# Patient Record
Sex: FEMALE | Race: OTHER | Employment: FULL TIME
[De-identification: names, ages, dates, MRNs, and addresses within clinical notes are randomized per-mention and may not be internally consistent; named-entity substitution may affect disease eponyms.]

---

## 2022-06-14 ENCOUNTER — OFFICE VISIT (OUTPATIENT)
Dept: FAMILY MEDICINE CLINIC | Facility: CLINIC | Age: 22
End: 2022-06-14
Payer: COMMERCIAL

## 2022-06-14 VITALS
BODY MASS INDEX: 39.25 KG/M2 | DIASTOLIC BLOOD PRESSURE: 80 MMHG | OXYGEN SATURATION: 99 % | SYSTOLIC BLOOD PRESSURE: 132 MMHG | HEIGHT: 69 IN | WEIGHT: 265 LBS | HEART RATE: 79 BPM

## 2022-06-14 DIAGNOSIS — E66.9 CLASS 2 OBESITY WITH BODY MASS INDEX (BMI) OF 39.0 TO 39.9 IN ADULT, UNSPECIFIED OBESITY TYPE, UNSPECIFIED WHETHER SERIOUS COMORBIDITY PRESENT: ICD-10-CM

## 2022-06-14 DIAGNOSIS — G44.229 CHRONIC TENSION-TYPE HEADACHE, NOT INTRACTABLE: Primary | ICD-10-CM

## 2022-06-14 DIAGNOSIS — G44.229 CHRONIC TENSION-TYPE HEADACHE, NOT INTRACTABLE: ICD-10-CM

## 2022-06-14 DIAGNOSIS — F43.9 SITUATIONAL STRESS: ICD-10-CM

## 2022-06-14 DIAGNOSIS — Z23 ENCOUNTER FOR IMMUNIZATION: ICD-10-CM

## 2022-06-14 DIAGNOSIS — Z12.4 SCREENING FOR CERVICAL CANCER: ICD-10-CM

## 2022-06-14 DIAGNOSIS — Z00.00 LABORATORY EXAM ORDERED AS PART OF ROUTINE GENERAL MEDICAL EXAMINATION: ICD-10-CM

## 2022-06-14 LAB
ALBUMIN SERPL-MCNC: 3.9 G/DL (ref 3.5–5)
ALBUMIN/GLOB SERPL: 1.1 {RATIO} (ref 1.2–3.5)
ALP SERPL-CCNC: 80 U/L (ref 50–136)
ALT SERPL-CCNC: 20 U/L (ref 12–65)
ANION GAP SERPL CALC-SCNC: 9 MMOL/L (ref 7–16)
AST SERPL-CCNC: 12 U/L (ref 15–37)
BASOPHILS # BLD: 0 K/UL (ref 0–0.2)
BASOPHILS NFR BLD: 0 % (ref 0–2)
BILIRUB SERPL-MCNC: 0.3 MG/DL (ref 0.2–1.1)
BUN SERPL-MCNC: 15 MG/DL (ref 6–23)
CALCIUM SERPL-MCNC: 9.4 MG/DL (ref 8.3–10.4)
CHLORIDE SERPL-SCNC: 107 MMOL/L (ref 98–107)
CHOLEST SERPL-MCNC: 189 MG/DL
CO2 SERPL-SCNC: 23 MMOL/L (ref 21–32)
CREAT SERPL-MCNC: 0.9 MG/DL (ref 0.6–1)
DIFFERENTIAL METHOD BLD: NORMAL
EOSINOPHIL # BLD: 0.1 K/UL (ref 0–0.8)
EOSINOPHIL NFR BLD: 1 % (ref 0.5–7.8)
ERYTHROCYTE [DISTWIDTH] IN BLOOD BY AUTOMATED COUNT: 13.4 % (ref 11.9–14.6)
GLOBULIN SER CALC-MCNC: 3.6 G/DL (ref 2.3–3.5)
GLUCOSE SERPL-MCNC: 90 MG/DL (ref 65–100)
HCT VFR BLD AUTO: 37.4 % (ref 35.8–46.3)
HCV AB SER QL: NONREACTIVE
HDLC SERPL-MCNC: 54 MG/DL (ref 40–60)
HDLC SERPL: 3.5 {RATIO}
HGB BLD-MCNC: 11.8 G/DL (ref 11.7–15.4)
IMM GRANULOCYTES # BLD AUTO: 0.1 K/UL (ref 0–0.5)
IMM GRANULOCYTES NFR BLD AUTO: 1 % (ref 0–5)
LDLC SERPL CALC-MCNC: 121.4 MG/DL
LYMPHOCYTES # BLD: 2.5 K/UL (ref 0.5–4.6)
LYMPHOCYTES NFR BLD: 24 % (ref 13–44)
MAGNESIUM SERPL-MCNC: 2.3 MG/DL (ref 1.8–2.4)
MCH RBC QN AUTO: 27.8 PG (ref 26.1–32.9)
MCHC RBC AUTO-ENTMCNC: 31.6 G/DL (ref 31.4–35)
MCV RBC AUTO: 88.2 FL (ref 79.6–97.8)
MONOCYTES # BLD: 0.7 K/UL (ref 0.1–1.3)
MONOCYTES NFR BLD: 6 % (ref 4–12)
NEUTS SEG # BLD: 7.1 K/UL (ref 1.7–8.2)
NEUTS SEG NFR BLD: 68 % (ref 43–78)
NRBC # BLD: 0 K/UL (ref 0–0.2)
PLATELET # BLD AUTO: 412 K/UL (ref 150–450)
PMV BLD AUTO: 9.5 FL (ref 9.4–12.3)
POTASSIUM SERPL-SCNC: 4 MMOL/L (ref 3.5–5.1)
PROT SERPL-MCNC: 7.5 G/DL (ref 6.3–8.2)
RBC # BLD AUTO: 4.24 M/UL (ref 4.05–5.2)
SODIUM SERPL-SCNC: 139 MMOL/L (ref 136–145)
T4 FREE SERPL-MCNC: 1.3 NG/DL (ref 0.9–1.8)
TRIGL SERPL-MCNC: 68 MG/DL (ref 35–150)
TSH, 3RD GENERATION: 1.35 UIU/ML (ref 0.36–3.74)
VLDLC SERPL CALC-MCNC: 13.6 MG/DL (ref 6–23)
WBC # BLD AUTO: 10.4 K/UL (ref 4.3–11.1)

## 2022-06-14 PROCEDURE — 99203 OFFICE O/P NEW LOW 30 MIN: CPT | Performed by: NURSE PRACTITIONER

## 2022-06-14 RX ORDER — CYCLOBENZAPRINE HCL 5 MG
5 TABLET ORAL 2 TIMES DAILY PRN
Qty: 30 TABLET | Refills: 2 | Status: SHIPPED | OUTPATIENT
Start: 2022-06-14 | End: 2022-08-15

## 2022-06-14 SDOH — ECONOMIC STABILITY: FOOD INSECURITY: WITHIN THE PAST 12 MONTHS, THE FOOD YOU BOUGHT JUST DIDN'T LAST AND YOU DIDN'T HAVE MONEY TO GET MORE.: NEVER TRUE

## 2022-06-14 SDOH — ECONOMIC STABILITY: FOOD INSECURITY: WITHIN THE PAST 12 MONTHS, YOU WORRIED THAT YOUR FOOD WOULD RUN OUT BEFORE YOU GOT MONEY TO BUY MORE.: NEVER TRUE

## 2022-06-14 ASSESSMENT — ENCOUNTER SYMPTOMS
FACIAL SWELLING: 0
GASTROINTESTINAL NEGATIVE: 1
ABDOMINAL DISTENTION: 0
ALLERGIC/IMMUNOLOGIC NEGATIVE: 1
EYE PAIN: 0
DIARRHEA: 0
STRIDOR: 0
NAUSEA: 0
RESPIRATORY NEGATIVE: 1
TROUBLE SWALLOWING: 0
SINUS PAIN: 0
SORE THROAT: 0
SINUS PRESSURE: 0
RECTAL PAIN: 0
BLOOD IN STOOL: 0
WHEEZING: 0
BACK PAIN: 0
VOMITING: 0
PHOTOPHOBIA: 1
ABDOMINAL PAIN: 0
CONSTIPATION: 0
EYE DISCHARGE: 0
SHORTNESS OF BREATH: 0
COUGH: 0
RHINORRHEA: 0
CHEST TIGHTNESS: 0
VOICE CHANGE: 0
ANAL BLEEDING: 0

## 2022-06-14 ASSESSMENT — PATIENT HEALTH QUESTIONNAIRE - PHQ9
SUM OF ALL RESPONSES TO PHQ QUESTIONS 1-9: 13
6. FEELING BAD ABOUT YOURSELF - OR THAT YOU ARE A FAILURE OR HAVE LET YOURSELF OR YOUR FAMILY DOWN: 1
5. POOR APPETITE OR OVEREATING: 2
2. FEELING DOWN, DEPRESSED OR HOPELESS: 1
4. FEELING TIRED OR HAVING LITTLE ENERGY: 2
SUM OF ALL RESPONSES TO PHQ QUESTIONS 1-9: 13
3. TROUBLE FALLING OR STAYING ASLEEP: 2
9. THOUGHTS THAT YOU WOULD BE BETTER OFF DEAD, OR OF HURTING YOURSELF: 0
10. IF YOU CHECKED OFF ANY PROBLEMS, HOW DIFFICULT HAVE THESE PROBLEMS MADE IT FOR YOU TO DO YOUR WORK, TAKE CARE OF THINGS AT HOME, OR GET ALONG WITH OTHER PEOPLE: 1
SUM OF ALL RESPONSES TO PHQ QUESTIONS 1-9: 13
SUM OF ALL RESPONSES TO PHQ9 QUESTIONS 1 & 2: 3
8. MOVING OR SPEAKING SO SLOWLY THAT OTHER PEOPLE COULD HAVE NOTICED. OR THE OPPOSITE, BEING SO FIGETY OR RESTLESS THAT YOU HAVE BEEN MOVING AROUND A LOT MORE THAN USUAL: 1
7. TROUBLE CONCENTRATING ON THINGS, SUCH AS READING THE NEWSPAPER OR WATCHING TELEVISION: 2
SUM OF ALL RESPONSES TO PHQ QUESTIONS 1-9: 13
1. LITTLE INTEREST OR PLEASURE IN DOING THINGS: 2

## 2022-06-14 ASSESSMENT — ANXIETY QUESTIONNAIRES
6. BECOMING EASILY ANNOYED OR IRRITABLE: 3
7. FEELING AFRAID AS IF SOMETHING AWFUL MIGHT HAPPEN: 1
IF YOU CHECKED OFF ANY PROBLEMS ON THIS QUESTIONNAIRE, HOW DIFFICULT HAVE THESE PROBLEMS MADE IT FOR YOU TO DO YOUR WORK, TAKE CARE OF THINGS AT HOME, OR GET ALONG WITH OTHER PEOPLE: SOMEWHAT DIFFICULT
1. FEELING NERVOUS, ANXIOUS, OR ON EDGE: 2
2. NOT BEING ABLE TO STOP OR CONTROL WORRYING: 1
5. BEING SO RESTLESS THAT IT IS HARD TO SIT STILL: 1
4. TROUBLE RELAXING: 2
3. WORRYING TOO MUCH ABOUT DIFFERENT THINGS: 1
GAD7 TOTAL SCORE: 11

## 2022-06-14 ASSESSMENT — SOCIAL DETERMINANTS OF HEALTH (SDOH): HOW HARD IS IT FOR YOU TO PAY FOR THE VERY BASICS LIKE FOOD, HOUSING, MEDICAL CARE, AND HEATING?: NOT HARD AT ALL

## 2022-06-14 NOTE — PROGRESS NOTES
[address]  Phone: [phone] Fax [phone]  Jadyn Andre. Jeannine MS, APRN, FNP-C  6/14/2022   Chief Complaint   Patient presents with    New Patient     Pt here today to [facility] care. Pt denies having a recent PCP. Pt denies taking any medications and denies having any sig PMH. The pt has two main concerns today    Headache     Pt reports having frequent tension migraines over past several months-up to 3 times a week recently. Pt reports that headaches start as trapezius and cervical paraspinus muscle tension that spreads to back, top, then front of her head. Has some photophobia associated. Denies any associated blurred vision, focal weakness or neuro defs, dizziness, syncope, CP, SOB, N/V/D/abd pain, or fever. Takes OTC Ibuprofen with partial relief    Stress     Pt reports having some situational depression/anxiety recently related to being frustrated with unsuccessful weight loss attempts. Pt denies any SI, HI, hallucinations. Pt interested in referral for counseling        ASSESSMENT/PLAN:  Below is the assessment and plan developed based on review of pertinent history, physical exam, labs, studies, and medications. 1. Chronic tension-type headache, not intractable  Pt reports having frequent tension migraines over past several months-up to 3 times a week recently. Pt reports that headaches start as trapezius and cervical paraspinus muscle tension that spreads to back, top, then front of her head. Has some photophobia associated. Denies any associated blurred vision, focal weakness or neuro defs, dizziness, syncope, CP, SOB, N/V/D/abd pain, or fever. Takes OTC Ibuprofen with partial relief. Pt also reports that she had a recent eye exam and got new glasses, but this has not helped with the tension migraines. Pt had no focal weakness or neuro defs on physical exam today. BP /80. Discussed with pt.  Seems to be having probable tension migraines. Will check CBC, CMP, TFT's, magnesium, and sed rate today to further evaluate. Will have pt try OTC Excedrin Migraine as directed and will give PRN low dose Flexeril as directed as well to try to help relieve her tension migraine. Urgent referral given to Neurology as well for further evaluation and treatment. Pt also encouraged to consider having a massage to help relieve her trapezius/cervical paraspinus muscle tension. Pt to f/u with me in 3 weeks to recheck. Agrees to call sooner for concerns/new or worsening symptoms. Agrees to go to ER for severe symptoms as discussed. - CBC with Auto Differential; Future  - Comprehensive Metabolic Panel; Future  - TSH; Future  - T4, Free; Future  - Magnesium; Future  - Sedimentation Rate; Future  - [address] Neurology [facility]  - cyclobenzaprine (FLEXERIL) 5 MG tablet; Take 1 tablet by mouth 2 times daily as needed (tension headaches-watch for sedation)  Dispense: 30 tablet; Refill: 2    2. Situational stress  Pt reports having some situational depression/anxiety recently related to being frustrated with unsuccessful weight loss attempts. Pt denies any SI, HI, hallucinations. Pt interested in referral for counseling. Discussed with pt. Will refer pt to Behavioral Health to start counseling. Pt encouraged to not be hard on herself regarding her weight loss. Pt encouraged to eat a healthy balanced diet and exercise as much as she can given her chronic tension migraines. Hopefully as her tension migraines improve, she will be able to exercise more and the weight loss will follow. Pt to f/u with me in 3 weeks to recheck. Pt agrees to call sooner for concerns/new or worsening symptoms.   - TSH; Future  - T4, Free; Future  - [id number] [facility]    3. Laboratory exam ordered as part of routine general medical examination  Pt to have following baseline labs drawn today.  Will discuss results with pt at next f/u.   - CBC with Auto Differential; Future  - Comprehensive Metabolic Panel; Future  - Lipid Panel; Future  - TSH; Future  - T4, Free; Future  - Hepatitis C Antibody; Future    4. Encounter for immunization  Pt reports being UTD on Tdap in . Pt also reports being UTD on Covid vax. Agrees to bring card to next appointment so I can update HCM. 5. Screening for cervical cancer  Pt due for PAP/[facility]. Referral given to [facility] - Ewa Torres DO, OB/GYN, [place]    6. Class 2 obesity with body mass index (BMI) of 39.0 to 39.9 in adult, unspecified obesity type, unspecified whether serious comorbidity present  Pt encouraged to continue to eat a healthy balanced diet and exercise as much as she can given her chronic tension migraines. Return in about 3 weeks (around 2022) for To recheck headaches/situational stress/review labs. Call sooner for concerns. SUBJECTIVE/OBJECTIVE:    CHEL-  Leonel Zelaya (: 2000) is a 24 y.o. female, New patient patient, here for evaluation of the following chief complaint(s):  New Patient (Pt here today to [facility] care. Pt denies having a recent PCP. Pt denies taking any medications and denies having any sig PMH. The pt has two main concerns today), Headache (Pt reports having frequent tension migraines over past several months-up to 3 times a week recently. Pt reports that headaches start as trapezius and cervical paraspinus muscle tension that spreads to back, top, then front of her head. Has some photophobia associated. Denies any associated blurred vision, focal weakness or neuro defs, dizziness, syncope, CP, SOB, N/V/D/abd pain, or fever. Takes OTC Ibuprofen with partial relief), and Stress (Pt reports having some situational depression/anxiety recently related to being frustrated with unsuccessful weight loss attempts. Pt denies any SI, HI, hallucinations. Pt interested in referral for counseling)  LMP-current.    Allergies   Allergen Reactions    Shellfish-Derived Products      [unfilled]  History reviewed. No pertinent past medical history. History reviewed. No pertinent surgical history. Family History   Problem Relation Age of Onset    No Known Problems Mother     No Known Problems Father     No Known Problems Brother     No Known Problems Brother     No Known Problems Brother     No Known Problems Brother     Diabetes Maternal Grandmother     Lung Cancer Maternal Grandfather     Diabetes Paternal Grandmother     No Known Problems Paternal Grandfather      Social History     Tobacco Use   Smoking Status Never Smoker   Smokeless Tobacco Never Used         Review of Systems   Constitutional: Negative. Negative for appetite change, chills, diaphoresis, fatigue, fever and unexpected weight change. HENT: Negative. Negative for congestion, ear discharge, ear pain, facial swelling, hearing loss, mouth sores, nosebleeds, postnasal drip, rhinorrhea, sinus pressure, sinus pain, sneezing, sore throat, tinnitus, trouble swallowing and voice change. Eyes: Positive for photophobia (with tension migraines). Negative for pain, discharge and visual disturbance. Respiratory: Negative. Negative for cough, chest tightness, shortness of breath, wheezing and stridor. Cardiovascular: Negative. Negative for chest pain, palpitations and leg swelling. Gastrointestinal: Negative. Negative for abdominal distention, abdominal pain, anal bleeding, blood in stool, constipation, diarrhea, nausea, rectal pain and vomiting. Endocrine: Negative. Genitourinary: Negative. Negative for decreased urine volume, difficulty urinating, dyspareunia, dysuria, flank pain, frequency, genital sores, hematuria, menstrual problem, pelvic pain, urgency, vaginal bleeding, vaginal discharge and vaginal pain.    Musculoskeletal: Positive for neck pain (Trapezius and cervical paraspinus muscle tension that spreads to back, top, front of head during tension migraines). Negative for arthralgias, back pain, gait problem, joint swelling, myalgias and neck stiffness. Skin: Negative. Negative for pallor and rash. Allergic/Immunologic: Negative. Negative for environmental allergies. Neurological: Positive for headaches (Pt reports having frequent tension migraines over past several months-up to 3 times a week recently). Negative for dizziness, tremors, seizures, syncope, facial asymmetry, speech difficulty, weakness, light-headedness and numbness. Hematological: Negative. Negative for adenopathy. Does not bruise/bleed easily. Psychiatric/Behavioral: Positive for dysphoric mood. Negative for hallucinations, self-injury, sleep disturbance and suicidal ideas. The patient is nervous/anxious. Pt reports having some situational depression/anxiety recently related to being frustrated with unsuccessful weight loss attempts. Pt denies any SI, HI, hallucinations. Vitals:    06/14/22 1543   BP: 132/80   Pulse: 79   SpO2: 99%       Physical Exam  Vitals reviewed. Constitutional:       General: She is not in acute distress. Appearance: Normal appearance. She is obese. She is not ill-appearing, toxic-appearing or diaphoretic. HENT:      Head: Normocephalic and atraumatic. Right Ear: Tympanic membrane, ear canal and external ear normal. There is no impacted cerumen. Left Ear: Tympanic membrane, ear canal and external ear normal. There is no impacted cerumen. Nose: Nose normal. No congestion or rhinorrhea. Mouth/Throat:      Mouth: Mucous membranes are moist.      Pharynx: Oropharynx is clear. No oropharyngeal exudate or posterior oropharyngeal erythema. Eyes:      General: No scleral icterus. Right eye: No discharge. Left eye: No discharge. Extraocular Movements: Extraocular movements intact. Conjunctiva/sclera: Conjunctivae normal.      Pupils: Pupils are equal, round, and reactive to light.    Neck: Comments: Good C and L spine alignment. No edema or point ttp to cervical vertebrae. Pt has some bilat trapezius and bilat cervical paraspinus spasm/tenderness noted   Cardiovascular:      Rate and Rhythm: Normal rate and regular rhythm. Pulses: Normal pulses. Heart sounds: Normal heart sounds. No murmur heard. No friction rub. No gallop. Pulmonary:      Effort: Pulmonary effort is normal. No respiratory distress. Breath sounds: Normal breath sounds. No stridor. No wheezing, rhonchi or rales. Chest:      Chest wall: No tenderness. Abdominal:      General: Abdomen is flat. Bowel sounds are normal. There is no distension. Palpations: Abdomen is soft. There is no mass. Tenderness: There is no abdominal tenderness. There is no right CVA tenderness, left CVA tenderness, guarding or rebound. Hernia: No hernia is present. Musculoskeletal:         General: Normal range of motion. Cervical back: Normal range of motion and neck supple. Tenderness present. No rigidity. Comments: Gait steady and unassisted   Lymphadenopathy:      Cervical: No cervical adenopathy. Skin:     General: Skin is warm. Coloration: Skin is not jaundiced or pale. Findings: No bruising or rash. Neurological:      General: No focal deficit present. Mental Status: She is alert and oriented to person, place, and time. Cranial Nerves: No cranial nerve deficit. Sensory: No sensory deficit. Motor: No weakness. Coordination: Coordination normal.      Gait: Gait normal.   Psychiatric:         Mood and Affect: Mood normal.         Behavior: Behavior normal.         Thought Content:  Thought content normal.         Judgment: Judgment normal.       PHQ-9 Total Score: 13 (6/14/2022  3:45 PM)  Thoughts that you would be better off dead, or of hurting yourself in some way: 0 (6/14/2022  3:45 PM)    NIKOS-7 SCREENING 6/14/2022   Feeling nervous, anxious, or on edge More than half the days   Not being able to stop or control worrying Several days   Worrying too much about different things Several days   Trouble relaxing More than half the days   Being so restless that it is hard to sit still Several days   Becoming easily annoyed or irritable Nearly every day   Feeling afraid as if something awful might happen Several days   NIKOS-7 Total Score 11   How difficult have these problems made it for you to do your work, take care of things at home, or get along with other people? Somewhat difficult       PLEASE NOTE:  This document has been produced using voice recognition software. Unrecognized errors in transcription may be present. An electronic signature was used to authenticate this note.   -- ANTHONY Mcintyre NP

## 2022-06-14 NOTE — PATIENT INSTRUCTIONS
prevent tension headaches.  Keep a headache diary. This can help you and your doctor figure out what is triggering your headaches. If you avoid your triggers, you may be able to prevent headaches.  Find healthy ways to deal with stress. Headaches are most common during or right after stressful times.  Get plenty of exercise every day. This can help with stress and muscle tension.  Get regular sleep.  Eat regularly and well. If you wait too long to eat, it can trigger a headache.  Try to use good posture and keep the muscles of your jaw, face, neck, and shoulders relaxed.  If you use a computer a lot, give your eyes a break by blinking more and sometimes looking away from the screen. Use glasses or contacts if you need them. And check that your monitor is about an arm's distance away. When should you call for help? Call 911 anytime you think you may need emergency care. For example, call if:     You have signs of a stroke. These may include:  ? Sudden numbness, paralysis, or weakness in your face, arm, or leg, especially on only one side of your body. ? Sudden vision changes. ? Sudden trouble speaking. ? Sudden confusion or trouble understanding simple statements. ? Sudden problems with walking or balance. ? A sudden, severe headache that is different from past headaches. Call your doctor now or seek immediate medical care if:     You have new or worse nausea and vomiting.      You have a new or higher fever.      Your headache gets much worse. Watch closely for changes in your health, and be sure to contact your doctor if:     You are not getting better after 2 days (48 hours). Where can you learn more? Go to https://nabil.[employer]. org and sign in to your [facility] account. Enter 25 [phone] [id number] in the [employer] box to learn more about \"Tension Headache: Care Instructions. \"     If you do not have an account, please click on the \"Sign Up Now\" link.   Current as of: December 13, 2021               Content Version: 13.2  © 0563-1892 [employer]. Care instructions adapted under license by [facility] ([facility]). If you have questions about a medical condition or this instruction, always ask your healthcare professional. [address] any warranty or liability for your use of this information.

## 2022-07-06 ENCOUNTER — OFFICE VISIT (OUTPATIENT)
Dept: FAMILY MEDICINE CLINIC | Facility: CLINIC | Age: 22
End: 2022-07-06
Payer: COMMERCIAL

## 2022-07-06 VITALS
WEIGHT: 288 LBS | DIASTOLIC BLOOD PRESSURE: 78 MMHG | OXYGEN SATURATION: 99 % | HEART RATE: 83 BPM | BODY MASS INDEX: 43.15 KG/M2 | SYSTOLIC BLOOD PRESSURE: 118 MMHG

## 2022-07-06 DIAGNOSIS — Z23 ENCOUNTER FOR IMMUNIZATION: ICD-10-CM

## 2022-07-06 DIAGNOSIS — G44.229 CHRONIC TENSION-TYPE HEADACHE, NOT INTRACTABLE: Primary | ICD-10-CM

## 2022-07-06 DIAGNOSIS — F43.9 SITUATIONAL STRESS: ICD-10-CM

## 2022-07-06 DIAGNOSIS — E66.9 CLASS 2 OBESITY WITH BODY MASS INDEX (BMI) OF 39.0 TO 39.9 IN ADULT, UNSPECIFIED OBESITY TYPE, UNSPECIFIED WHETHER SERIOUS COMORBIDITY PRESENT: ICD-10-CM

## 2022-07-06 DIAGNOSIS — E78.00 PURE HYPERCHOLESTEROLEMIA: ICD-10-CM

## 2022-07-06 DIAGNOSIS — Z12.4 SCREENING FOR CERVICAL CANCER: ICD-10-CM

## 2022-07-06 PROCEDURE — 99213 OFFICE O/P EST LOW 20 MIN: CPT | Performed by: NURSE PRACTITIONER

## 2022-07-06 ASSESSMENT — ENCOUNTER SYMPTOMS
PHOTOPHOBIA: 0
ABDOMINAL PAIN: 0
FACIAL SWELLING: 0
VOMITING: 0
NAUSEA: 0
RECTAL PAIN: 0
SINUS PAIN: 0
SORE THROAT: 0
EYE PAIN: 0
DIARRHEA: 0
RESPIRATORY NEGATIVE: 1
STRIDOR: 0
EYES NEGATIVE: 1
TROUBLE SWALLOWING: 0
BACK PAIN: 0
ANAL BLEEDING: 0
WHEEZING: 0
GASTROINTESTINAL NEGATIVE: 1
CONSTIPATION: 0
CHEST TIGHTNESS: 0
ABDOMINAL DISTENTION: 0
SINUS PRESSURE: 0
SHORTNESS OF BREATH: 0
ALLERGIC/IMMUNOLOGIC NEGATIVE: 1
EYE DISCHARGE: 0
COUGH: 0
VOICE CHANGE: 0
RHINORRHEA: 0
BLOOD IN STOOL: 0

## 2022-07-06 NOTE — PROGRESS NOTES
[address] [address] [place], [address]   Phone: [phone] Fax [phone]   Gagandeep AgarwalBijan Paez MS, APRN, FNP-C   7/6/2022        Chief Complaint   Patient presents with    Follow-up     Pt here today for routine lab review and to recheck tension migraines and situational depression/anxiety. The pt reports following POC/taking medications as directed. LMP-current per pt    Headache     Pt reports tension migraines much improved. Now occuring just once a week and very mild. Pain relieved by PRN Flexeril and PRN OTC Excedrin Migraine per pt. Pt reports photophobia has resolved. Denies any new or worsening symptoms. Pt never made appt with Neuro as she feels her tension migraines are now well controlled and tolerable.  Stress     Pt reports situational depression/anxiety related to tension migraines is now resolved. Pt states that she has a lot less stress now that her tension migraines are better controlled. ASSESSMENT/PLAN:   Below is the assessment and plan developed based on review of pertinent history, physical exam, labs, studies, and medications. 1. Chronic tension-type headache, not intractable   Pt reports tension migraines much improved. Now occuring just once a week and very mild. Pain relieved by PRN Flexeril and PRN OTC Excedrin Migraine per pt. Pt reports photophobia has resolved. Denies any new or worsening symptoms. Pt never made appt with Neuro as she feels her tension migraines are now well controlled and tolerable. Discussed with pt. Ok to hold off on Neuro referral for now. Pt can continue PRN Flexeril and PRN OTC Excedrin Migraine as before. Pt to f/u with me in 6 months to recheck. Agrees to call sooner for concerns/new or worsening symptoms. Agrees to go to ER for severe symptoms as discussed. Will monitor. 2. Situational stress   Pt reports situational depression/anxiety related to tension migraines is now resolved.  Pt states that she has a lot less stress now that her tension migraines are better controlled. Will resolve from problem list.   3. Encounter for immunization   Pt appears UTD on vaccines for now. 4. Screening for cervical cancer   Pt was referred to [facility] for [facility]/P[facility]. Has not yet scheduled appointment. Pt given number to call to make an appointment. Agrees to do so in near future. 5. Class 2 obesity with body mass index (BMI) of 39.0 to 39.9 in adult, unspecified obesity type, unspecified whether serious comorbidity present   See # 6 below. 6. Pure hypercholesterolemia   On 22, pt's LDL-C was mildly elevated at 121.4. Rest of lipid panel looked fine. Discussed with pt. Will have pt follow heart healthy diet-limit fried, processed, fatty foods. Eat more lean proteins, fruits, vegetables. Exercise as much as possible. Will recheck fasting lipids prior to f/u with me in 6 months. Will monitor.   - Lipid Panel; Future   Return in about 6 months (around 2023) for To recheck HLD/tension headaches. Call sooner for concerns. .   SUBJECTIVE/OBJECTIVE:   HPI 22-   Alexi Corey (: 2000) is a 24 y.o. female, New patient patient, here for evaluation of the following chief complaint(s):   New Patient (Pt here today to [facility] care. Pt denies having a recent PCP. Pt denies taking any medications and denies having any sig PMH. The pt has two main concerns today), Headache (Pt reports having frequent tension migraines over past several months-up to 3 times a week recently. Pt reports that headaches start as trapezius and cervical paraspinus muscle tension that spreads to back, top, then front of her head. Has some photophobia associated. Denies any associated blurred vision, focal weakness or neuro defs, dizziness, syncope, CP, SOB, N/V/D/abd pain, or fever.  Takes OTC Ibuprofen with partial relief), and Stress (Pt reports having some situational depression/anxiety recently related to being frustrated with unsuccessful weight loss attempts. Pt denies any SI, HI, hallucinations. Pt interested in referral for counseling)   HPI today-   Maya Whitaker (: 2000) is a 20 y.o. female, Established patient patient, here for evaluation of the following chief complaint(s):   Follow-up (Pt here today for routine lab review and to recheck tension migraines and situational depression/anxiety. The pt reports following POC/taking medications as directed. LMP-current per pt), Headache (Pt reports tension migraines much improved. Now occuring just once a week and very mild. Pain relieved by PRN Flexeril and PRN OTC Excedrin Migraine per pt. Pt reports photophobia has resolved. Denies any new or worsening symptoms. Pt never made appt with Neuro as she feels her tension migraines are now well controlled and tolerable. ), and Stress (Pt reports situational depression/anxiety related to tension migraines is now resolved. Pt states that she has a lot less stress now that her tension migraines are better controlled. )          Allergies   Allergen Reactions    Shellfish-Derived Products      [unfilled]   Past Medical History        Past Surgical History        Family History                                                                                            Social History         Tobacco Use   Smoking Status Never Smoker   Smokeless Tobacco Never Used     Review of Systems   Constitutional: Negative. Negative for appetite change, chills, diaphoresis, fatigue, fever and unexpected weight change. HENT: Negative. Negative for congestion, ear discharge, ear pain, facial swelling, hearing loss, mouth sores, nosebleeds, postnasal drip, rhinorrhea, sinus pressure, sinus pain, sneezing, sore throat, tinnitus, trouble swallowing and voice change. Eyes: Negative. Negative for photophobia (from tension migraines resolved), pain, discharge and visual disturbance. Respiratory: Negative.  Negative for cough, chest tightness, shortness of breath, wheezing and stridor. Cardiovascular: Negative. Negative for chest pain, palpitations and leg swelling. Gastrointestinal: Negative. Negative for abdominal distention, abdominal pain, anal bleeding, blood in stool, constipation, diarrhea, nausea, rectal pain and vomiting. Endocrine: Negative. Genitourinary: Negative. Negative for decreased urine volume, difficulty urinating, dyspareunia, dysuria, flank pain, frequency, genital sores, hematuria, menstrual problem, pelvic pain, urgency, vaginal bleeding, vaginal discharge and vaginal pain. Musculoskeletal: Positive for neck pain (trapezius and cervical paraspinus muscle tension that spreads to back, top, front of head during tension migraines-now very mild and much improved per pt). Negative for arthralgias, back pain, gait problem, joint swelling, myalgias and neck stiffness. Skin: Negative. Negative for pallor and rash. Allergic/Immunologic: Negative. Negative for environmental allergies. Neurological: Positive for headaches (tension migraines much improved per pt-now once a week and very mild-relieved by PRN Flexeril and PRN OTC Excedrin Migraine). Negative for dizziness, tremors, seizures, syncope, facial asymmetry, speech difficulty, weakness, light-headedness and numbness. Hematological: Negative. Negative for adenopathy. Does not bruise/bleed easily. Psychiatric/Behavioral: Negative. Negative for dysphoric mood (resolved), hallucinations, self-injury, sleep disturbance and suicidal ideas. The patient is not nervous/anxious (resolved). Situational depression/anxiety related to tension migraines resolved per pt         Vitals:    07/06/22 0924   BP: 118/78   Pulse: 83   SpO2: 99%     Physical Exam   Vitals reviewed. Constitutional:   General: She is not in acute distress. Appearance: Normal appearance. She is obese. She is not ill-appearing, toxic-appearing or diaphoretic. HENT:   Head: Normocephalic and atraumatic.    Right Ear: Tympanic membrane, ear canal and external ear normal. There is no impacted cerumen. Left Ear: Tympanic membrane, ear canal and external ear normal. There is no impacted cerumen. Nose: Nose normal. No congestion or rhinorrhea. Mouth/Throat:   Mouth: Mucous membranes are moist.   Pharynx: Oropharynx is clear. No oropharyngeal exudate or posterior oropharyngeal erythema. Eyes:   General: No scleral icterus. Right eye: No discharge. Left eye: No discharge. Extraocular Movements: Extraocular movements intact. Conjunctiva/sclera: Conjunctivae normal.   Pupils: Pupils are equal, round, and reactive to light. Neck:   Comments: Good C and L spine alignment. No edema or point ttp to cervical vertebrae. Pt has minimal bilat trapezius and bilat cervical paraspinus spasm/tenderness, but improved over last visit  Cardiovascular:   Rate and Rhythm: Normal rate and regular rhythm. Pulses: Normal pulses. Heart sounds: Normal heart sounds. No murmur heard. No friction rub. No gallop. Pulmonary:   Effort: Pulmonary effort is normal. No respiratory distress. Breath sounds: Normal breath sounds. No stridor. No wheezing, rhonchi or rales. Chest:   Chest wall: No tenderness. Abdominal:   General: Abdomen is flat. Bowel sounds are normal. There is no distension. Palpations: Abdomen is soft. Tenderness: There is no abdominal tenderness. There is no guarding. Musculoskeletal:   General: Normal range of motion. Cervical back: Normal range of motion and neck supple. Tenderness present. No rigidity. Comments: Gait steady and unassisted   Lymphadenopathy:   Cervical: No cervical adenopathy. Skin:   General: Skin is warm. Coloration: Skin is not jaundiced or pale. Findings: No bruising or rash. Neurological:   General: No focal deficit present. Mental Status: She is alert and oriented to person, place, and time. Cranial Nerves: No cranial nerve deficit. Sensory: No sensory deficit. Motor: No weakness. Coordination: Coordination normal.   Gait: Gait normal.   Psychiatric:   Mood and Affect: Mood normal.   Behavior: Behavior normal.   Thought Content:  Thought content normal.   Judgment: Judgment normal.   Following labs reviewed with pt   Component  Latest Ref Rng & Units 6/14/2022 6/14/2022 6/14/2022      4:36 PM 4:36 PM 4:36 PM   TSH, 3RD GENERATION  0.358 - 3.740 uIU/mL   1.350   T4 Free  0.9 - 1.8 NG/DL  1.3    Magnesium  1.8 - 2.4 mg/dL 2.3       Component  Latest Ref Rng & Units 6/14/2022 6/14/2022      4:36 PM 4:36 PM   Sodium  136 - 145 mmol/L  139   Potassium  3.5 - 5.1 mmol/L  4.0   Chloride  98 - 107 mmol/L  107   CO2  21 - 32 mmol/L  23   Anion Gap  7 - 16 mmol/L  9   GLUCOSE, FASTING,GF  65 - 100 mg/dL  90   BUN,BUNPL  6 - 23 MG/DL  15   Creatinine  0.6 - 1.0 MG/DL  0.90   GFR African American  >60 ml/min/1.73m2  >60   GFR Non-African American  >60 ml/min/1.73m2  >60   CALCIUM, SERUM, [id number]  8.3 - 10.4 MG/DL  9.4   Bilirubin  0.2 - 1.1 MG/DL  0.3   ALT  12 - 65 U/L  20   AST  15 - 37 U/L  12 (L)   Alk Phosphatase  50 - 136 U/L  80   Total Protein  6.3 - 8.2 g/dL  7.5   Albumin  3.5 - 5.0 g/dL  3.9   Globulin  2.3 - 3.5 g/dL  3.6 (H)   ALBUMIN/GLOBULIN RATIO  1.2 - 3.5   1.1 (L)   CHOLESTEROL, TOTAL, [id number]  <200 MG/    Triglycerides  35 - 150 MG/DL 68    HDL Cholesterol  40 - 60 MG/DL 54    LDL Calculated  <100 MG/.4 (H)    VLDL Cholesterol Calculated  6.0 - 23.0 MG/DL 13.6    Chol/HDL Ratio   3.5      Component  Latest Ref Rng & Units 6/14/2022 6/14/2022      4:36 PM 4:36 PM   WBC  4.3 - 11.1 K/uL 10.4    RBC  4.05 - 5.2 M/uL 4.24    Hemoglobin Quant  11.7 - 15.4 g/dL 11.8    Hematocrit  35.8 - 46.3 % 37.4    MCV  79.6 - 97.8 FL 88.2    MCH  26.1 - 32.9 PG 27.8    MCHC  31.4 - 35.0 g/dL 31.6    RDW  11.9 - 14.6 % 13.4    Platelet Count  949 - 450 K/uL 412    MPV  9.4 - 12.3 FL 9.5    Nucleated Red Blood Cells  0.0 - 0.2 K/uL 0.00    Differential Type   AUTOMATED Seg Neutrophils  43 - 78 % 68    Lymphocytes  13 - 44 % 24    Monocytes  4.0 - 12.0 % 6    Eosinophils %  0.5 - 7.8 % 1    Basophils  0.0 - 2.0 % 0    Immature Granulocytes  0.0 - 5.0 % 1    Segs Absolute  1.7 - 8.2 K/UL 7.1    Absolute Lymph #  0.5 - 4.6 K/UL 2.5    Absolute Mono #  0.1 - 1.3 K/UL 0.7    Absolute Eos #  0.0 - 0.8 K/UL 0.1    Basophils Absolute  0.0 - 0.2 K/UL 0.0    Absolute Immature Granulocyte  0.0 - 0.5 K/UL 0.1    Hepatitis C Ab  NONREACTIVE   NONREACTIVE   PLEASE NOTE: This document has been produced using voice recognition software. Unrecognized errors in transcription may be present. An electronic signature was used to authenticate this note.    -- ANTHONY Heller NP

## 2022-07-06 NOTE — PROGRESS NOTES
[address], [address] [place] [address]  Phone: [phone] Fax [phone]  Ashok Garcia. Jeannine MS, APRN, FNP-C  7/6/2022   Chief Complaint   Patient presents with    Follow-up     Pt here today for routine lab review and to recheck tension migraines and situational depression/anxiety. The pt reports following POC/taking medications as directed. LMP-current per pt    Headache     Pt reports tension migraines much improved. Now occuring just once a week and very mild. Pain relieved by PRN Flexeril and PRN OTC Excedrin Migraine per pt. Pt reports photophobia has resolved. Denies any new or worsening symptoms. Pt never made appt with Neuro as she feels her tension migraines are now well controlled and tolerable.  Stress     Pt reports situational depression/anxiety related to tension migraines is now resolved. Pt states that she has a lot less stress now that her tension migraines are better controlled. ASSESSMENT/PLAN:  Below is the assessment and plan developed based on review of pertinent history, physical exam, labs, studies, and medications. 1. Chronic tension-type headache, not intractable  Pt reports tension migraines much improved. Now occuring just once a week and very mild. Pain relieved by PRN Flexeril and PRN OTC Excedrin Migraine per pt. Pt reports photophobia has resolved. Denies any new or worsening symptoms. Pt never made appt with Neuro as she feels her tension migraines are now well controlled and tolerable. Discussed with pt. Ok to hold off on Neuro referral for now. Pt can continue PRN Flexeril and PRN OTC Excedrin Migraine as before. Pt to f/u with me in 6 months to recheck. Agrees to call sooner for concerns/new or worsening symptoms. Agrees to go to ER for severe symptoms as discussed. Will monitor. 2. Situational stress  Pt reports situational depression/anxiety related to tension migraines is now resolved.  Pt states that she has a lot less stress now that her tension migraines are better controlled. Will resolve from problem list.     3. Encounter for immunization  Pt appears UTD on vaccines for now. 4. Screening for cervical cancer  Pt was referred to [facility] for [facility]/PAP. Has not yet scheduled appointment. Pt given number to call to make an appointment. Agrees to do so in near future. 5. Class 2 obesity with body mass index (BMI) of 39.0 to 39.9 in adult, unspecified obesity type, unspecified whether serious comorbidity present  See # 6 below. 6. Pure hypercholesterolemia  On 22, pt's LDL-C was mildly elevated at 121.4. Rest of lipid panel looked fine. Discussed with pt. Will have pt follow heart healthy diet-limit fried, processed, fatty foods. Eat more lean proteins, fruits, vegetables. Exercise as much as possible. Will recheck fasting lipids prior to f/u with me in 6 months. Will monitor.   - Lipid Panel; Future        Return in about 6 months (around 2023) for To recheck HLD/tension headaches. Call sooner for concerns. .        SUBJECTIVE/OBJECTIVE:    HPI 22-  Chani Rosales (: 2000) is a 24 y.o. female, New patient patient, here for evaluation of the following chief complaint(s):  New Patient (Pt here today to [facility] care. Pt denies having a recent PCP. Pt denies taking any medications and denies having any sig PMH. The pt has two main concerns today), Headache (Pt reports having frequent tension migraines over past several months-up to 3 times a week recently. Pt reports that headaches start as trapezius and cervical paraspinus muscle tension that spreads to back, top, then front of her head. Has some photophobia associated. Denies any associated blurred vision, focal weakness or neuro defs, dizziness, syncope, CP, SOB, N/V/D/abd pain, or fever.  Takes OTC Ibuprofen with partial relief), and Stress (Pt reports having some situational depression/anxiety recently related to being frustrated with unsuccessful weight loss attempts. Pt denies any SI, HI, hallucinations. Pt interested in referral for counseling)    HPI today-  David Rodríguez (: 2000) is a 20 y.o. female, Established patient patient, here for evaluation of the following chief complaint(s):  Follow-up (Pt here today for routine lab review and to recheck tension migraines and situational depression/anxiety. The pt reports following POC/taking medications as directed. LMP-current per pt), Headache (Pt reports tension migraines much improved. Now occuring just once a week and very mild. Pain relieved by PRN Flexeril and PRN OTC Excedrin Migraine per pt. Pt reports photophobia has resolved. Denies any new or worsening symptoms. Pt never made appt with Neuro as she feels her tension migraines are now well controlled and tolerable. ), and Stress (Pt reports situational depression/anxiety related to tension migraines is now resolved. Pt states that she has a lot less stress now that her tension migraines are better controlled. )     Allergies   Allergen Reactions    Shellfish-Derived Products      [unfilled]  History reviewed. No pertinent past medical history. History reviewed. No pertinent surgical history. Family History   Problem Relation Age of Onset    No Known Problems Mother     No Known Problems Father     No Known Problems Brother     No Known Problems Brother     No Known Problems Brother     No Known Problems Brother     Diabetes Maternal Grandmother     Lung Cancer Maternal Grandfather     Diabetes Paternal Grandmother     No Known Problems Paternal Grandfather      Social History     Tobacco Use   Smoking Status Never Smoker   Smokeless Tobacco Never Used         Review of Systems   Constitutional: Negative. Negative for appetite change, chills, diaphoresis, fatigue, fever and unexpected weight change. HENT: Negative.   Negative for congestion, ear discharge, ear pain, facial swelling, hearing loss, mouth sores, nosebleeds, postnasal drip, rhinorrhea, sinus pressure, sinus pain, sneezing, sore throat, tinnitus, trouble swallowing and voice change. Eyes: Negative. Negative for photophobia (from tension migraines resolved), pain, discharge and visual disturbance. Respiratory: Negative. Negative for cough, chest tightness, shortness of breath, wheezing and stridor. Cardiovascular: Negative. Negative for chest pain, palpitations and leg swelling. Gastrointestinal: Negative. Negative for abdominal distention, abdominal pain, anal bleeding, blood in stool, constipation, diarrhea, nausea, rectal pain and vomiting. Endocrine: Negative. Genitourinary: Negative. Negative for decreased urine volume, difficulty urinating, dyspareunia, dysuria, flank pain, frequency, genital sores, hematuria, menstrual problem, pelvic pain, urgency, vaginal bleeding, vaginal discharge and vaginal pain. Musculoskeletal: Positive for neck pain (trapezius and cervical paraspinus muscle tension that spreads to back, top, front of head during tension migraines-now very mild and much improved per pt). Negative for arthralgias, back pain, gait problem, joint swelling, myalgias and neck stiffness. Skin: Negative. Negative for pallor and rash. Allergic/Immunologic: Negative. Negative for environmental allergies. Neurological: Positive for headaches (tension migraines much improved per pt-now once a week and very mild-relieved by PRN Flexeril and PRN OTC Excedrin Migraine). Negative for dizziness, tremors, seizures, syncope, facial asymmetry, speech difficulty, weakness, light-headedness and numbness. Hematological: Negative. Negative for adenopathy. Does not bruise/bleed easily. Psychiatric/Behavioral: Negative. Negative for dysphoric mood (resolved), hallucinations, self-injury, sleep disturbance and suicidal ideas. The patient is not nervous/anxious (resolved).          Situational depression/anxiety related to tension migraines resolved per pt         Vitals:    07/06/22 0924   BP: 118/78   Pulse: 83   SpO2: 99%       Physical Exam  Vitals reviewed. Constitutional:       General: She is not in acute distress. Appearance: Normal appearance. She is obese. She is not ill-appearing, toxic-appearing or diaphoretic. HENT:      Head: Normocephalic and atraumatic. Right Ear: Tympanic membrane, ear canal and external ear normal. There is no impacted cerumen. Left Ear: Tympanic membrane, ear canal and external ear normal. There is no impacted cerumen. Nose: Nose normal. No congestion or rhinorrhea. Mouth/Throat:      Mouth: Mucous membranes are moist.      Pharynx: Oropharynx is clear. No oropharyngeal exudate or posterior oropharyngeal erythema. Eyes:      General: No scleral icterus. Right eye: No discharge. Left eye: No discharge. Extraocular Movements: Extraocular movements intact. Conjunctiva/sclera: Conjunctivae normal.      Pupils: Pupils are equal, round, and reactive to light. Neck:      Comments: Good C and L spine alignment. No edema or point ttp to cervical vertebrae. Pt has minimal bilat trapezius and bilat cervical paraspinus spasm/tenderness, but improved over last visit  Cardiovascular:      Rate and Rhythm: Normal rate and regular rhythm. Pulses: Normal pulses. Heart sounds: Normal heart sounds. No murmur heard. No friction rub. No gallop. Pulmonary:      Effort: Pulmonary effort is normal. No respiratory distress. Breath sounds: Normal breath sounds. No stridor. No wheezing, rhonchi or rales. Chest:      Chest wall: No tenderness. Abdominal:      General: Abdomen is flat. Bowel sounds are normal. There is no distension. Palpations: Abdomen is soft. Tenderness: There is no abdominal tenderness. There is no guarding. Musculoskeletal:         General: Normal range of motion. Cervical back: Normal range of motion and neck supple. Tenderness present. No rigidity. Comments: Gait steady and unassisted   Lymphadenopathy:      Cervical: No cervical adenopathy. Skin:     General: Skin is warm. Coloration: Skin is not jaundiced or pale. Findings: No bruising or rash. Neurological:      General: No focal deficit present. Mental Status: She is alert and oriented to person, place, and time. Cranial Nerves: No cranial nerve deficit. Sensory: No sensory deficit. Motor: No weakness. Coordination: Coordination normal.      Gait: Gait normal.   Psychiatric:         Mood and Affect: Mood normal.         Behavior: Behavior normal.         Thought Content:  Thought content normal.         Judgment: Judgment normal.     Following labs reviewed with pt  Component      Latest Ref Rng & Units 6/14/2022 6/14/2022 6/14/2022           4:36 PM  4:36 PM  4:36 PM   TSH, 3RD GENERATION      0.358 - 3.740 uIU/mL   1.350   T4 Free      0.9 - 1.8 NG/DL  1.3    Magnesium      1.8 - 2.4 mg/dL 2.3       Component      Latest Ref Rng & Units 6/14/2022 6/14/2022           4:36 PM  4:36 PM   Sodium      136 - 145 mmol/L  139   Potassium      3.5 - 5.1 mmol/L  4.0   Chloride      98 - 107 mmol/L  107   CO2      21 - 32 mmol/L  23   Anion Gap      7 - 16 mmol/L  9   GLUCOSE, FASTING,GF      65 - 100 mg/dL  90   BUN,BUNPL      6 - 23 MG/DL  15   Creatinine      0.6 - 1.0 MG/DL  0.90   GFR African American      >60 ml/min/1.73m2  >60   GFR Non-African American      >60 ml/min/1.73m2  >60   CALCIUM, SERUM, 649712      8.3 - 10.4 MG/DL  9.4   Bilirubin      0.2 - 1.1 MG/DL  0.3   ALT      12 - 65 U/L  20   AST      15 - 37 U/L  12 (L)   Alk Phosphatase      50 - 136 U/L  80   Total Protein      6.3 - 8.2 g/dL  7.5   Albumin      3.5 - 5.0 g/dL  3.9   Globulin      2.3 - 3.5 g/dL  3.6 (H)   ALBUMIN/GLOBULIN RATIO      1.2 - 3.5    1.1 (L)   CHOLESTEROL, TOTAL, 074840      <200 MG/    Triglycerides      35 - 150 MG/DL 68    HDL Cholesterol      40 - 60 MG/DL 54    LDL Calculated      <100 MG/.4 (H)    VLDL Cholesterol Calculated      6.0 - 23.0 MG/DL 13.6    Chol/HDL Ratio       3.5      Component      Latest Ref Rng & Units 6/14/2022 6/14/2022           4:36 PM  4:36 PM   WBC      4.3 - 11.1 K/uL 10.4    RBC      4.05 - 5.2 M/uL 4.24    Hemoglobin Quant      11.7 - 15.4 g/dL 11.8    Hematocrit      35.8 - 46.3 % 37.4    MCV      79.6 - 97.8 FL 88.2    MCH      26.1 - 32.9 PG 27.8    MCHC      31.4 - 35.0 g/dL 31.6    RDW      11.9 - 14.6 % 13.4    Platelet Count      244 - 450 K/uL 412    MPV      9.4 - 12.3 FL 9.5    Nucleated Red Blood Cells      0.0 - 0.2 K/uL 0.00    Differential Type       AUTOMATED    Seg Neutrophils      43 - 78 % 68    Lymphocytes      13 - 44 % 24    Monocytes      4.0 - 12.0 % 6    Eosinophils %      0.5 - 7.8 % 1    Basophils      0.0 - 2.0 % 0    Immature Granulocytes      0.0 - 5.0 % 1    Segs Absolute      1.7 - 8.2 K/UL 7.1    Absolute Lymph #      0.5 - 4.6 K/UL 2.5    Absolute Mono #      0.1 - 1.3 K/UL 0.7    Absolute Eos #      0.0 - 0.8 K/UL 0.1    Basophils Absolute      0.0 - 0.2 K/UL 0.0    Absolute Immature Granulocyte      0.0 - 0.5 K/UL 0.1    Hepatitis C Ab      NONREACTIVE    NONREACTIVE     PLEASE NOTE:  This document has been produced using voice recognition software. Unrecognized errors in transcription may be present. An electronic signature was used to authenticate this note.   -- ANTHONY Crandall - PATRICE

## 2022-07-06 NOTE — PROGRESS NOTES
[address] [facility]  Phone: [phone] Fax [phone]  Sean JeansBijan Carringtonzia MS, APRN, FNP-C  7/6/2022   Chief Complaint   Patient presents with    Follow-up     Pt here today for routine lab review and to recheck tension migraines and situational depression/anxiety. The pt reports following POC/taking medications as directed. LMP-current per pt    Headache     Pt reports tension migraines much improved. Now occuring just once a week and very mild. Pain relieved by PRN Flexeril and PRN OTC Excedrin Migraine per pt. Pt reports photophobia has resolved. Denies any new or worsening symptoms. Pt never made appt with Neuro as she feels her tension migraines are now well controlled and tolerable.  Stress     Pt reports situational depression/anxiety related to tension migraines is now resolved. Pt states that she has a lot less stress now that her tension migraines are better controlled. ASSESSMENT/PLAN:  Below is the assessment and plan developed based on review of pertinent history, physical exam, labs, studies, and medications. 1. Chronic tension-type headache, not intractable  Pt reports tension migraines much improved. Now occuring just once a week and very mild. Pain relieved by PRN Flexeril and PRN OTC Excedrin Migraine per pt. Pt reports photophobia has resolved. Denies any new or worsening symptoms. Pt never made appt with Neuro as she feels her tension migraines are now well controlled and tolerable. Discussed with pt. Ok to hold off on Neuro referral for now. Pt can continue PRN Flexeril and PRN OTC Excedrin Migraine as before. Pt to f/u with me in 6 months to recheck. Agrees to call sooner for concerns/new or worsening symptoms. Agrees to go to ER for severe symptoms as discussed. Will monitor. 2. Situational stress  Pt reports situational depression/anxiety related to tension migraines is now resolved.  Pt states that she has a lot less stress now that her tension migraines are better controlled. Will resolve from problem list.     3. Encounter for immunization  Pt appears UTD on vaccines for now. 4. Screening for cervical cancer  Pt was referred to [facility] for [facility]/PAP. Has not yet scheduled appointment. Pt given number to call to make an appointment. Agrees to do so in near future. 5. Class 2 obesity with body mass index (BMI) of 39.0 to 39.9 in adult, unspecified obesity type, unspecified whether serious comorbidity present  See # 6 below. 6. Pure hypercholesterolemia  On 22, pt's LDL-C was mildly elevated at 121.4. Rest of lipid panel looked fine. Discussed with pt. Will have pt follow heart healthy diet-limit fried, processed, fatty foods. Eat more lean proteins, fruits, vegetables. Exercise as much as possible. Will recheck fasting lipids prior to f/u with me in 6 months. Will monitor.   - Lipid Panel; Future        Return in about 6 months (around 2023) for To recheck HLD/tension headaches. Call sooner for concerns. .        SUBJECTIVE/OBJECTIVE:    HPI 22-  Jemma Vale (: 2000) is a 24 y.o. female, New patient patient, here for evaluation of the following chief complaint(s):  New Patient (Pt here today to [facility] care. Pt denies having a recent PCP. Pt denies taking any medications and denies having any sig PMH. The pt has two main concerns today), Headache (Pt reports having frequent tension migraines over past several months-up to 3 times a week recently. Pt reports that headaches start as trapezius and cervical paraspinus muscle tension that spreads to back, top, then front of her head. Has some photophobia associated. Denies any associated blurred vision, focal weakness or neuro defs, dizziness, syncope, CP, SOB, N/V/D/abd pain, or fever.  Takes OTC Ibuprofen with partial relief), and Stress (Pt reports having some situational depression/anxiety recently related to being frustrated with unsuccessful weight loss attempts. Pt denies any SI, HI, hallucinations. Pt interested in referral for counseling)    HPI today-  Landry Manuel (: 2000) is a 20 y.o. female, Established patient patient, here for evaluation of the following chief complaint(s):  Follow-up (Pt here today for routine lab review and to recheck tension migraines and situational depression/anxiety. The pt reports following POC/taking medications as directed. LMP-current per pt), Headache (Pt reports tension migraines much improved. Now occuring just once a week and very mild. Pain relieved by PRN Flexeril and PRN OTC Excedrin Migraine per pt. Pt reports photophobia has resolved. Denies any new or worsening symptoms. Pt never made appt with Neuro as she feels her tension migraines are now well controlled and tolerable. ), and Stress (Pt reports situational depression/anxiety related to tension migraines is now resolved. Pt states that she has a lot less stress now that her tension migraines are better controlled. )     Allergies   Allergen Reactions    Shellfish-Derived Products      [unfilled]  History reviewed. No pertinent past medical history. History reviewed. No pertinent surgical history. Family History   Problem Relation Age of Onset    No Known Problems Mother     No Known Problems Father     No Known Problems Brother     No Known Problems Brother     No Known Problems Brother     No Known Problems Brother     Diabetes Maternal Grandmother     Lung Cancer Maternal Grandfather     Diabetes Paternal Grandmother     No Known Problems Paternal Grandfather      Social History     Tobacco Use   Smoking Status Never Smoker   Smokeless Tobacco Never Used         Review of Systems   Constitutional: Negative. Negative for appetite change, chills, diaphoresis, fatigue, fever and unexpected weight change. HENT: Negative.   Negative for congestion, ear discharge, ear pain, facial swelling, hearing loss, mouth sores, nosebleeds, postnasal drip, rhinorrhea, sinus pressure, sinus pain, sneezing, sore throat, tinnitus, trouble swallowing and voice change. Eyes: Negative. Negative for photophobia (from tension migraines resolved), pain, discharge and visual disturbance. Respiratory: Negative. Negative for cough, chest tightness, shortness of breath, wheezing and stridor. Cardiovascular: Negative. Negative for chest pain, palpitations and leg swelling. Gastrointestinal: Negative. Negative for abdominal distention, abdominal pain, anal bleeding, blood in stool, constipation, diarrhea, nausea, rectal pain and vomiting. Endocrine: Negative. Genitourinary: Negative. Negative for decreased urine volume, difficulty urinating, dyspareunia, dysuria, flank pain, frequency, genital sores, hematuria, menstrual problem, pelvic pain, urgency, vaginal bleeding, vaginal discharge and vaginal pain. Musculoskeletal: Positive for neck pain (trapezius and cervical paraspinus muscle tension that spreads to back, top, front of head during tension migraines-now very mild and much improved per pt). Negative for arthralgias, back pain, gait problem, joint swelling, myalgias and neck stiffness. Skin: Negative. Negative for pallor and rash. Allergic/Immunologic: Negative. Negative for environmental allergies. Neurological: Positive for headaches (tension migraines much improved per pt-now once a week and very mild-relieved by PRN Flexeril and PRN OTC Excedrin Migraine). Negative for dizziness, tremors, seizures, syncope, facial asymmetry, speech difficulty, weakness, light-headedness and numbness. Hematological: Negative. Negative for adenopathy. Does not bruise/bleed easily. Psychiatric/Behavioral: Negative. Negative for dysphoric mood (resolved), hallucinations, self-injury, sleep disturbance and suicidal ideas. The patient is not nervous/anxious (resolved).          Situational depression/anxiety related to tension migraines resolved per pt         Vitals:    07/06/22 0924   BP: 118/78   Pulse: 83   SpO2: 99%       Physical Exam  Vitals reviewed. Constitutional:       General: She is not in acute distress. Appearance: Normal appearance. She is obese. She is not ill-appearing, toxic-appearing or diaphoretic. HENT:      Head: Normocephalic and atraumatic. Right Ear: Tympanic membrane, ear canal and external ear normal. There is no impacted cerumen. Left Ear: Tympanic membrane, ear canal and external ear normal. There is no impacted cerumen. Nose: Nose normal. No congestion or rhinorrhea. Mouth/Throat:      Mouth: Mucous membranes are moist.      Pharynx: Oropharynx is clear. No oropharyngeal exudate or posterior oropharyngeal erythema. Eyes:      General: No scleral icterus. Right eye: No discharge. Left eye: No discharge. Extraocular Movements: Extraocular movements intact. Conjunctiva/sclera: Conjunctivae normal.      Pupils: Pupils are equal, round, and reactive to light. Neck:      Comments: Good C and L spine alignment. No edema or point ttp to cervical vertebrae. Pt has minimal bilat trapezius and bilat cervical paraspinus spasm/tenderness, but improved over last visit  Cardiovascular:      Rate and Rhythm: Normal rate and regular rhythm. Pulses: Normal pulses. Heart sounds: Normal heart sounds. No murmur heard. No friction rub. No gallop. Pulmonary:      Effort: Pulmonary effort is normal. No respiratory distress. Breath sounds: Normal breath sounds. No stridor. No wheezing, rhonchi or rales. Chest:      Chest wall: No tenderness. Abdominal:      General: Abdomen is flat. Bowel sounds are normal. There is no distension. Palpations: Abdomen is soft. Tenderness: There is no abdominal tenderness. There is no guarding. Musculoskeletal:         General: Normal range of motion. Cervical back: Normal range of motion and neck supple. Tenderness present. No rigidity. Comments: Gait steady and unassisted   Lymphadenopathy:      Cervical: No cervical adenopathy. Skin:     General: Skin is warm. Coloration: Skin is not jaundiced or pale. Findings: No bruising or rash. Neurological:      General: No focal deficit present. Mental Status: She is alert and oriented to person, place, and time. Cranial Nerves: No cranial nerve deficit. Sensory: No sensory deficit. Motor: No weakness. Coordination: Coordination normal.      Gait: Gait normal.   Psychiatric:         Mood and Affect: Mood normal.         Behavior: Behavior normal.         Thought Content:  Thought content normal.         Judgment: Judgment normal.     Following labs reviewed with pt  Component      Latest Ref Rng & Units 6/14/2022 6/14/2022 6/14/2022           4:36 PM  4:36 PM  4:36 PM   TSH, 3RD GENERATION      0.358 - 3.740 uIU/mL   1.350   T4 Free      0.9 - 1.8 NG/DL  1.3    Magnesium      1.8 - 2.4 mg/dL 2.3       Component      Latest Ref Rng & Units 6/14/2022 6/14/2022           4:36 PM  4:36 PM   Sodium      136 - 145 mmol/L  139   Potassium      3.5 - 5.1 mmol/L  4.0   Chloride      98 - 107 mmol/L  107   CO2      21 - 32 mmol/L  23   Anion Gap      7 - 16 mmol/L  9   GLUCOSE, FASTING,GF      65 - 100 mg/dL  90   BUN,BUNPL      6 - 23 MG/DL  15   Creatinine      0.6 - 1.0 MG/DL  0.90   GFR African American      >60 ml/min/1.73m2  >60   GFR Non-African American      >60 ml/min/1.73m2  >60   CALCIUM, SERUM, [id number]      8.3 - 10.4 MG/DL  9.4   Bilirubin      0.2 - 1.1 MG/DL  0.3   ALT      12 - 65 U/L  20   AST      15 - 37 U/L  12 (L)   Alk Phosphatase      50 - 136 U/L  80   Total Protein      6.3 - 8.2 g/dL  7.5   Albumin      3.5 - 5.0 g/dL  3.9   Globulin      2.3 - 3.5 g/dL  3.6 (H)   ALBUMIN/GLOBULIN RATIO      1.2 - 3.5    1.1 (L)   CHOLESTEROL, TOTAL, [id number]      <200 MG/    Triglycerides      35 - 150 MG/DL 68    HDL Cholesterol      40 - 60 MG/DL 54    LDL Calculated      <100 MG/.4 (H)    VLDL Cholesterol Calculated      6.0 - 23.0 MG/DL 13.6    Chol/HDL Ratio       3.5      Component      Latest Ref Rng & Units 6/14/2022 6/14/2022           4:36 PM  4:36 PM   WBC      4.3 - 11.1 K/uL 10.4    RBC      4.05 - 5.2 M/uL 4.24    Hemoglobin Quant      11.7 - 15.4 g/dL 11.8    Hematocrit      35.8 - 46.3 % 37.4    MCV      79.6 - 97.8 FL 88.2    MCH      26.1 - 32.9 PG 27.8    MCHC      31.4 - 35.0 g/dL 31.6    RDW      11.9 - 14.6 % 13.4    Platelet Count      995 - 450 K/uL 412    MPV      9.4 - 12.3 FL 9.5    Nucleated Red Blood Cells      0.0 - 0.2 K/uL 0.00    Differential Type       AUTOMATED    Seg Neutrophils      43 - 78 % 68    Lymphocytes      13 - 44 % 24    Monocytes      4.0 - 12.0 % 6    Eosinophils %      0.5 - 7.8 % 1    Basophils      0.0 - 2.0 % 0    Immature Granulocytes      0.0 - 5.0 % 1    Segs Absolute      1.7 - 8.2 K/UL 7.1    Absolute Lymph #      0.5 - 4.6 K/UL 2.5    Absolute Mono #      0.1 - 1.3 K/UL 0.7    Absolute Eos #      0.0 - 0.8 K/UL 0.1    Basophils Absolute      0.0 - 0.2 K/UL 0.0    Absolute Immature Granulocyte      0.0 - 0.5 K/UL 0.1    Hepatitis C Ab      NONREACTIVE    NONREACTIVE     PLEASE NOTE:  This document has been produced using voice recognition software. Unrecognized errors in transcription may be present. Addendum created as erroneous encounter was applied to this chart by mistake by Lesley Ray MA    An electronic signature was used to authenticate this note.   -- Fredy Hendrickson, APRN - NP

## 2022-07-06 NOTE — PATIENT INSTRUCTIONS
Patient Education      Patient Education        Heart-Healthy Diet: Care Instructions  Your Care Instructions     A heart-healthy diet has lots of vegetables, fruits, nuts, beans, and whole grains, and is low in salt. It limits foods that are high in saturated fat, such as meats, cheeses, and fried foods. It may be hard to change your diet,but even small changes can lower your risk of heart attack and heart disease. Follow-up care is a key part of your treatment and safety. Be sure to make and go to all appointments, and call your doctor if you are having problems. It's also a good idea to know your test results and keep alist of the medicines you take. How can you care for yourself at home? Watch your portions   Use food labels to learn what the recommended servings are for the foods you eat.  Eat only the number of calories you need to stay at a healthy weight. If you need to lose weight, eat fewer calories than your body burns (through exercise and other physical activity). Eat more fruits and vegetables   Eat a variety of fruit and vegetables every day. Dark green, deep orange, red, or yellow fruits and vegetables are especially good for you. Examples include spinach, carrots, peaches, and berries.  Keep carrots, celery, and other veggies handy for snacks. Buy fruit that is in season and store it where you can see it so that you will be tempted to eat it.  Cook dishes that have a lot of veggies in them, such as stir-fries and soups. Limit saturated fat   Read food labels, and try to avoid saturated fats. They increase your risk of heart disease.  Use olive or canola oil when you cook.  Bake, broil, grill, or steam foods instead of frying them.  Choose lean meats instead of high-fat meats such as hot dogs and sausages. Cut off all visible fat when you prepare meat.  Eat fish, skinless poultry, and meat alternatives such as soy products instead of high-fat meats.  Soy products, such as tofu, may be especially good for your heart.  Choose low-fat or fat-free milk and dairy products. Eat foods high in fiber   Eat a variety of grain products every day. Include whole-grain foods that have lots of fiber and nutrients. Examples of whole-grain foods include oats, whole wheat bread, and brown rice.  Buy whole-grain breads and cereals, instead of white bread or pastries. Limit salt and sodium   Limit how much salt and sodium you eat to help lower your blood pressure.  Taste food before you salt it. Add only a little salt when you think you need it. With time, your taste buds will adjust to less salt.  Eat fewer snack items, fast foods, and other high-salt, processed foods. Check food labels for the amount of sodium in packaged foods.  Choose low-sodium versions of canned goods (such as soups, vegetables, and beans). Limit sugar   Limit drinks and foods with added sugar. These include candy, desserts, and soda pop. Limit alcohol   Limit alcohol to no more than 2 drinks a day for men and 1 drink a day for women. Too much alcohol can cause health problems. When should you call for help? Watch closely for changes in your health, and be sure to contact your doctor if:     You would like help planning heart-healthy meals. Where can you learn more? Go to https://[employer] org and sign in to your [address] account. Enter V137 in the [facility] box to learn more about \"Heart-Healthy Diet: Care Instructions. \"     If you do not have an account, please click on the \"Sign Up Now\" link. Current as of: September 8, 2021               Content Version: 13.3  © 4738-9916 Healthwise, Incorporated. Care instructions adapted under license by [facility] ([facility]). If you have questions about a medical condition or this instruction, always ask your healthcare professional. [place] [address] any warranty or liability for your use of this information.          Tension Headache: Care Instructions  Overview  Most headaches are tension headaches. Some people get them often, especially ifthey have a lot of stress in their lives. This kind of headache may cause pain or a feeling of pressure all over yourhead. Sometimes it's hard to know where the center of the pain is. If you get a lot of these kind of headaches, the best way to reduce them is tofind out what's causing them. Then you can make changes in those areas. Follow-up care is a key part of your treatment and safety. Be sure to make and go to all appointments, and call your doctor if you are having problems. It's also a good idea to know your test results and keep alist of the medicines you take. How can you care for yourself at home?  Rest in a quiet, dark room. Put a cool cloth on your forehead. Close your eyes, and try to relax or go to sleep. Do not watch TV, read, or use the computer.  Use a warm, moist towel or a heating pad set on low to relax tight shoulder and neck muscles.  Have someone gently massage your neck and shoulders.  Be safe with medicines. Read and follow all instructions on the label. ? If the doctor gave you a prescription medicine for pain, take it as prescribed. ? If you are not taking a prescription pain medicine, ask your doctor if you can take an over-the-counter medicine.  Be careful not to take more pain medicine than the instructions say. This is because you may get worse or more frequent headaches when the medicine wears off.  If you get a headache, stop what you are doing and sit quietly for a moment. Close your eyes and breathe slowly. Try to relax your head and neck muscles.  Pay attention to any new symptoms you have when you have a headache. These include a fever, weakness or numbness, vision changes, or confusion. They may be signs of a more serious problem. How can you prevent tension headaches?   Here are some things you can do to help prevent tension 2021               Content Version: 13.3  © 4765-7177 Healthwise, Incorporated. Care instructions adapted under license by [facility] ([facility]). If you have questions about a medical condition or this instruction, always ask your healthcare professional. [address] any warranty or liability for your use of this information.

## 2022-08-12 DIAGNOSIS — G44.229 CHRONIC TENSION-TYPE HEADACHE, NOT INTRACTABLE: ICD-10-CM

## 2022-08-15 RX ORDER — CYCLOBENZAPRINE HCL 5 MG
5 TABLET ORAL 2 TIMES DAILY PRN
Qty: 30 TABLET | Refills: 2 | Status: SHIPPED | OUTPATIENT
Start: 2022-08-15 | End: 2022-10-24

## 2022-10-18 NOTE — PROGRESS NOTES
Patient presents today for a routine gynecological examination with no complaints. Pt reports she was given first depo provera injection 21 and has had variable bleeding since then. Has heavier flow x 1wk and then light bleeding/spotting in between. She is really unable to distinguish an LMP since  as her bleeding has been constant, albeit light, since receiving her first depo injection  She did not like the bleeding profile so she did not return for another depo when she was due in September. She reports her bleeding has improved since initially starting depo, now only with daily spotting, sometimes brown, sometimes red. She does use a pad but notes it does not saturate. Only c/w spotting. Prior to starting depo, Periods were regular q 28d, lasting 1wk and were moderately painful. She would like to consider Mirena IUD moving forward for [facility]    She reports +coarse body hair and facial hair. She notes she has dark areas under her arms and between her legs that are bothersome to her. She had fasting labs done with PCP recently but do not see that a1c was drawn. She has + strong family hx of DM2. She is fasting today. OB History          0    Para   0    Term   0       0    AB   0    Living   0         SAB   0    IAB   0    Ectopic   0    Molar   0    Multiple   0    Live Births   0                  GYN History     Last pap: Never    Past Medical History:  History reviewed. No pertinent past medical history. Past Surgical History:  History reviewed. No pertinent surgical history. Allergies:    Allergies   Allergen Reactions    Shellfish-Derived Products        Medication History:  Current Outpatient Medications   Medication Sig Dispense Refill    miSOPROStol (CYTOTEC) 100 MCG tablet Insert 1 tablet PV night before procedure and take 1 tablet PO night before procedure 1202 tablet 0    cyclobenzaprine (FLEXERIL) 5 MG tablet TAKE 1 TABLET BY MOUTH 2 TIMES DAILY AS NEEDED (TENSION HEADACHES-WATCH FOR SEDATION) 30 tablet 2     No current facility-administered medications for this visit. Social History:  Social History     Socioeconomic History    Marital status: Single     Spouse name: Not on file    Number of children: Not on file    Years of education: Not on file    Highest education level: Not on file   Occupational History    Not on file   Tobacco Use    Smoking status: Never    Smokeless tobacco: Never   Vaping Use    Vaping Use: Never used   Substance and Sexual Activity    Alcohol use: Yes    Drug use: Never    Sexual activity: Yes     Partners: Male   Other Topics Concern    Not on file   Social History Narrative    Not on file     Social Determinants of Health     Financial Resource Strain: Low Risk     Difficulty of Paying Living Expenses: Not hard at all   Food Insecurity: No Food Insecurity    Worried About Running Out of Food in the Last Year: Never true    Ran Out of Food in the Last Year: Never true   Transportation Needs: Not on file   Physical Activity: Not on file   Stress: Not on file   Social Connections: Not on file   Intimate Partner Violence: Not on file   Housing Stability: Not on file       Family History:  Family History   Problem Relation Age of Onset    No Known Problems Mother     No Known Problems Father     No Known Problems Brother     No Known Problems Brother     No Known Problems Brother     No Known Problems Brother     Diabetes Maternal Grandmother     Lung Cancer Maternal Grandfather     Diabetes Paternal Grandmother     No Known Problems Paternal Grandfather        Review of Systems - General ROS: negative except for that discussed in HPI      ROS:  Feeling well. No dyspnea or chest pain on exertion. No abdominal pain, change in bowel habits, black or bloody stools. No urinary tract symptoms. No neurological complaints.     Objective:   /68   Ht 5' 8.5\" (1.74 m)   Wt 263 lb 6.4 oz (119.5 kg)   LMP 06/05/2022 (Approximate) Comment: Had depo shot 6/5/22 and has had irregular bleeding since  BMI 39.47 kg/m²   The patient appears well, alert, oriented x 3, in no distress. ENT normal.  Neck supple. No adenopathy or thyromegaly. Lungs:  clear, good air entry, no wheezes, rhonchi or rales. Heart:  S1 and S2 normal, no murmurs, regular rate and rhythm. Abdomen:  soft without tenderness, guarding, mass or organomegaly. Extremities show no edema, normal peripheral pulses. Neurological is normal, no focal findings. BREAST EXAM: breasts appear normal, no suspicious masses, no skin or nipple changes or axillary nodes, risk and benefit of breast self-exam was discussed    PELVIC EXAM: VULVA: normal appearing vulva with no masses, tenderness or lesions, VAGINA: normal appearing vagina with normal color and discharge, no lesions, CERVIX: normal appearing cervix without discharge or lesions, UTERUS: uterus is normal size, shape, consistency and nontender, ADNEXA: normal adnexa in size, nontender and no masses    Acanthosis nigricans noted at axilla and inner thighs    Assessment/Plan:     1. Screening for genitourinary condition    - AMB POC URINALYSIS DIP STICK MANUAL W/O MICRO    2. Screening for human papillomavirus (HPV)    - PAP IG, CT-NG-TV, rfx Aptima HPV ASCUS ([id number]); Future  - PAP IG, CT-NG-TV, rfx Aptima HPV ASCUS ([id number])    3. Screening for malignant neoplasm of cervix    - PAP IG, CT-NG-TV, rfx Aptima HPV ASCUS ([id number]); Future  - PAP IG, CT-NG-TV, rfx Aptima HPV ASCUS ([id number])    4. Venereal disease screening    - PAP IG, CT-NG-TV, rfx Aptima HPV ASCUS ([id number]); Future  - PAP IG, CT-NG-TV, rfx Aptima HPV ASCUS ([id number])    5. Well woman exam    - AMB POC URINALYSIS DIP STICK MANUAL W/O MICRO  - PAP IG, CT-NG-TV, rfx Aptima HPV ASCUS ([id number]); Future  - PAP IG, CT-NG-TV, rfx Aptima HPV ASCUS ([id number])    6. Acanthosis nigricans    - Insulin, Serum; Future  - Hemoglobin A1C; Future  - Hemoglobin A1C  - Insulin, Serum    7. Family history of diabetes mellitus    - Insulin, Serum; Future  - Hemoglobin A1C; Future  - Hemoglobin A1C  - Insulin, Serum     Disc acanthosis nigricans and often can be an indicator of insulin resistance. Since she is fasting today will check fasting insulin and a1c  If elevated fasting insulin will plan for metformin to see if helps improve. Disc that her irreg spotting since starting depo is likely r/t depo use. She has not yet gotten a normal period back yet, was due for another depo a month ago which she did not return for. Would expect menses back soon but we did discuss sometimes can be a delay in return to menses with depo use. She would like to proceed with mirena. Options for birth control discussed with Josemanuel Soriano, including OCP, Nurvaring, patch, Depo-Provera, Nexplanon, Shelly, Mirena and ParaGard. Patient is interested in [place]arked. Risk of uterine perforation, expulsion, the devise becoming imbedded, difficulty with placement or removal.  Premedication with Cytotec night prior to procedure. Prescription provided with instruction. Timing of placement (either on period or after 2 weeks of NO intercourse) and premedication with Motrin prior to placement. Patient would like to move forward. We will pre-certify for the mirena and patient will schedule at her convenience    Will place with US guidance as she has very posterior cx and is nulliparous. pap smear  return annually or prn    Supervising physician is Dr. Queta Gamez.     Farida Harrison, ANTHONY - CNP

## 2022-10-20 ENCOUNTER — OFFICE VISIT (OUTPATIENT)
Dept: OBGYN CLINIC | Age: 22
End: 2022-10-20
Payer: COMMERCIAL

## 2022-10-20 VITALS
SYSTOLIC BLOOD PRESSURE: 121 MMHG | BODY MASS INDEX: 39.01 KG/M2 | DIASTOLIC BLOOD PRESSURE: 68 MMHG | HEIGHT: 69 IN | WEIGHT: 263.4 LBS

## 2022-10-20 DIAGNOSIS — Z12.4 SCREENING FOR MALIGNANT NEOPLASM OF CERVIX: ICD-10-CM

## 2022-10-20 DIAGNOSIS — L83 ACANTHOSIS NIGRICANS: ICD-10-CM

## 2022-10-20 DIAGNOSIS — Z01.419 WELL WOMAN EXAM: Primary | ICD-10-CM

## 2022-10-20 DIAGNOSIS — Z83.3 FAMILY HISTORY OF DIABETES MELLITUS: ICD-10-CM

## 2022-10-20 DIAGNOSIS — Z13.89 SCREENING FOR GENITOURINARY CONDITION: ICD-10-CM

## 2022-10-20 DIAGNOSIS — Z11.3 VENEREAL DISEASE SCREENING: ICD-10-CM

## 2022-10-20 DIAGNOSIS — Z11.51 SCREENING FOR HUMAN PAPILLOMAVIRUS (HPV): ICD-10-CM

## 2022-10-20 LAB
BILIRUBIN, URINE, POC: NEGATIVE
BLOOD URINE, POC: NORMAL
GLUCOSE URINE, POC: NEGATIVE
KETONES, URINE, POC: NEGATIVE
LEUKOCYTE ESTERASE, URINE, POC: NORMAL
NITRITE, URINE, POC: NEGATIVE
PH, URINE, POC: 5.5 (ref 4.6–8)
PROTEIN,URINE, POC: 30
SPECIFIC GRAVITY, URINE, POC: 1.02 (ref 1–1.03)
URINALYSIS CLARITY, POC: CLEAR
URINALYSIS COLOR, POC: YELLOW
UROBILINOGEN, POC: NORMAL

## 2022-10-20 PROCEDURE — 99385 PREV VISIT NEW AGE 18-39: CPT | Performed by: NURSE PRACTITIONER

## 2022-10-20 PROCEDURE — 81002 URINALYSIS NONAUTO W/O SCOPE: CPT | Performed by: NURSE PRACTITIONER

## 2022-10-20 RX ORDER — MISOPROSTOL 100 UG/1
TABLET ORAL
Qty: 1202 TABLET | Refills: 0 | Status: SHIPPED | OUTPATIENT
Start: 2022-10-20

## 2022-10-21 LAB
EST. AVERAGE GLUCOSE BLD GHB EST-MCNC: 117 MG/DL
HBA1C MFR BLD: 5.7 % (ref 4.8–5.6)

## 2022-10-22 LAB — INSULIN SERPL-ACNC: 57.4 UIU/ML (ref 2.6–24.9)

## 2022-10-23 DIAGNOSIS — G44.229 CHRONIC TENSION-TYPE HEADACHE, NOT INTRACTABLE: ICD-10-CM

## 2022-10-24 ENCOUNTER — TELEPHONE (OUTPATIENT)
Dept: OBGYN CLINIC | Age: 22
End: 2022-10-24

## 2022-10-24 ENCOUNTER — PATIENT MESSAGE (OUTPATIENT)
Dept: OBGYN CLINIC | Age: 22
End: 2022-10-24

## 2022-10-24 RX ORDER — CYCLOBENZAPRINE HCL 5 MG
5 TABLET ORAL 2 TIMES DAILY PRN
Qty: 30 TABLET | Refills: 2 | Status: SHIPPED | OUTPATIENT
Start: 2022-10-24

## 2022-10-24 NOTE — TELEPHONE ENCOUNTER
The patient was notified about her results and prescription:   Fasting insulin elevated, indicating insulin resistance. would rec starting metformin 500mg daily, can increase to bid after 1 mo if she tolerates well. Pls warn of possibility of GI upset. Hgb a1c is elevated, indicating she is preDM. Metformin should help this as well as reducing carb and sugar in take in diet, working on weight loss. Continue plan for mirena.

## 2022-10-24 NOTE — TELEPHONE ENCOUNTER
From: Marie SCHRADER  To: Sara Lorenz  Sent: 10/24/2022 1:26 PM EDT  Subject: results    Rositamichael Rand, wanted to review your lab results with you. Fasting insulin elevated, indicating insulin resistance. .... recommend starting Metformin 500 mg daily, can increase to twice a day after 1 month if you tolerate well. Just warning there is a possibility of upset stomach. Hemoglobin a1c is elevated, indicating she is pre diabetic. Metformin should help this as well as reducing carb and sugar in take in diet, working on weight loss. Continue plan for mirena insertion.

## 2023-01-09 DIAGNOSIS — G44.229 CHRONIC TENSION-TYPE HEADACHE, NOT INTRACTABLE: ICD-10-CM

## 2023-01-09 DIAGNOSIS — E78.00 PURE HYPERCHOLESTEROLEMIA: ICD-10-CM

## 2023-01-09 LAB
CHOLEST SERPL-MCNC: 200 MG/DL
ERYTHROCYTE [SEDIMENTATION RATE] IN BLOOD: 16 MM/HR (ref 0–20)
HDLC SERPL-MCNC: 50 MG/DL (ref 40–60)
HDLC SERPL: 4
LDLC SERPL CALC-MCNC: 129.6 MG/DL
TRIGL SERPL-MCNC: 102 MG/DL (ref 35–150)
VLDLC SERPL CALC-MCNC: 20.4 MG/DL (ref 6–23)

## 2023-01-12 ENCOUNTER — OFFICE VISIT (OUTPATIENT)
Dept: FAMILY MEDICINE CLINIC | Facility: CLINIC | Age: 23
End: 2023-01-12
Payer: COMMERCIAL

## 2023-01-12 VITALS
BODY MASS INDEX: 39.69 KG/M2 | HEIGHT: 69 IN | DIASTOLIC BLOOD PRESSURE: 82 MMHG | HEART RATE: 83 BPM | OXYGEN SATURATION: 99 % | WEIGHT: 268 LBS | SYSTOLIC BLOOD PRESSURE: 114 MMHG

## 2023-01-12 DIAGNOSIS — E78.00 PURE HYPERCHOLESTEROLEMIA: ICD-10-CM

## 2023-01-12 DIAGNOSIS — E66.01 OBESITY, MORBID, BMI 40.0-49.9 (HCC): ICD-10-CM

## 2023-01-12 DIAGNOSIS — R21 RASH DUE TO ALLERGY: ICD-10-CM

## 2023-01-12 DIAGNOSIS — R73.03 PREDIABETES: ICD-10-CM

## 2023-01-12 DIAGNOSIS — Z12.4 SCREENING FOR CERVICAL CANCER: ICD-10-CM

## 2023-01-12 DIAGNOSIS — T78.40XA RASH DUE TO ALLERGY: ICD-10-CM

## 2023-01-12 DIAGNOSIS — Z23 ENCOUNTER FOR IMMUNIZATION: ICD-10-CM

## 2023-01-12 DIAGNOSIS — G44.229 CHRONIC TENSION-TYPE HEADACHE, NOT INTRACTABLE: Primary | ICD-10-CM

## 2023-01-12 PROCEDURE — 99214 OFFICE O/P EST MOD 30 MIN: CPT | Performed by: NURSE PRACTITIONER

## 2023-01-12 RX ORDER — CYCLOBENZAPRINE HCL 5 MG
5 TABLET ORAL 2 TIMES DAILY PRN
Qty: 30 TABLET | Refills: 2 | Status: SHIPPED | OUTPATIENT
Start: 2023-01-12

## 2023-01-12 RX ORDER — CETIRIZINE HYDROCHLORIDE 10 MG/1
10 TABLET ORAL DAILY
Qty: 30 TABLET | Refills: 2 | Status: SHIPPED | OUTPATIENT
Start: 2023-01-12

## 2023-01-12 ASSESSMENT — PATIENT HEALTH QUESTIONNAIRE - PHQ9
10. IF YOU CHECKED OFF ANY PROBLEMS, HOW DIFFICULT HAVE THESE PROBLEMS MADE IT FOR YOU TO DO YOUR WORK, TAKE CARE OF THINGS AT HOME, OR GET ALONG WITH OTHER PEOPLE: 0
SUM OF ALL RESPONSES TO PHQ9 QUESTIONS 1 & 2: 0
2. FEELING DOWN, DEPRESSED OR HOPELESS: 0
6. FEELING BAD ABOUT YOURSELF - OR THAT YOU ARE A FAILURE OR HAVE LET YOURSELF OR YOUR FAMILY DOWN: 0
9. THOUGHTS THAT YOU WOULD BE BETTER OFF DEAD, OR OF HURTING YOURSELF: 0
8. MOVING OR SPEAKING SO SLOWLY THAT OTHER PEOPLE COULD HAVE NOTICED. OR THE OPPOSITE, BEING SO FIGETY OR RESTLESS THAT YOU HAVE BEEN MOVING AROUND A LOT MORE THAN USUAL: 0
SUM OF ALL RESPONSES TO PHQ QUESTIONS 1-9: 0
4. FEELING TIRED OR HAVING LITTLE ENERGY: 0
5. POOR APPETITE OR OVEREATING: 0
1. LITTLE INTEREST OR PLEASURE IN DOING THINGS: 0
3. TROUBLE FALLING OR STAYING ASLEEP: 0
7. TROUBLE CONCENTRATING ON THINGS, SUCH AS READING THE NEWSPAPER OR WATCHING TELEVISION: 0
SUM OF ALL RESPONSES TO PHQ QUESTIONS 1-9: 0

## 2023-01-12 ASSESSMENT — ENCOUNTER SYMPTOMS
RHINORRHEA: 0
FACIAL SWELLING: 0
PHOTOPHOBIA: 0
NAUSEA: 0
COLOR CHANGE: 0
CONSTIPATION: 0
ANAL BLEEDING: 0
SORE THROAT: 0
CHOKING: 0
EYE PAIN: 0
RECTAL PAIN: 0
STRIDOR: 0
SINUS PAIN: 0
ABDOMINAL DISTENTION: 0
BLOOD IN STOOL: 0
SINUS PRESSURE: 0
VOICE CHANGE: 0
EYE DISCHARGE: 0
WHEEZING: 0
CHEST TIGHTNESS: 0
GASTROINTESTINAL NEGATIVE: 1
VOMITING: 0
TROUBLE SWALLOWING: 0
EYES NEGATIVE: 1
COUGH: 0
RESPIRATORY NEGATIVE: 1
DIARRHEA: 0
ABDOMINAL PAIN: 0
SHORTNESS OF BREATH: 0
APNEA: 0
BACK PAIN: 0

## 2023-01-12 NOTE — PROGRESS NOTES
[address] [facility], [address] [facility]  Phone: [phone] Fax [phone]  Susie Mc. Jeannine MS, APRN, FNP-C  1/12/2023   Chief Complaint   Patient presents with    Follow-up     Pt here today for routine lab review and to recheck tension migraines, HLD, and prediabetes. Pt reports taking medications/following previous POC as discussed. Please see ROS/Assessment and Plan sections for full details of these conditions. Pt has one additional concern today. Rash     Pt reports having an intermittent pruritic rash to bilat arms/legs, back, chest, abdomen that comes and goes for past few months after changing lotions. Pt reports trying to switch to a free and clear lotion and rash still seems to occur after use. Pt does not know of anything else that she changed. Pt denies any associated face, lip, tongue, throat edema or any trouble breathing or swallowing. Pt has no rash at the present time. Pt would like referral to Allergist for allergy testing. LMP 1/11/23 per pt    ASSESSMENT/PLAN:  Below is the assessment and plan developed based on review of pertinent history, physical exam, labs, studies, and medications. 1. Chronic tension-type headache, not intractable  Tension migraines much improved per pt-very rare/mild-relieved by PRN Flexeril and PRN OTC Excedrin Migraine per pt. Discussed with pt. Will continue/refill PRN Flexeril and have pt continue PRN OTC Excedrin Migraine as directed if she happens to need it. Pt to f/u with me in 3 months to recheck. Pt agrees to let me know if her tension migraines worsen, change, or become more frequent. Will monitor.   - cyclobenzaprine (FLEXERIL) 5 MG tablet; Take 1 tablet by mouth 2 times daily as needed (tension headaches-watch for sedation)  Dispense: 30 tablet; Refill: 2  - CBC with Auto Differential; Future  - TSH with Reflex; Future    2.  Pure hypercholesterolemia  On 1/9/23, pt's triglycerides were , HDL WNL 50, LDL-C remains borderline elevated at 129.6. Slightly worse than last check. Discussed with pt. Pt admits that she has not been exercising as frequently as she should and has not been following her heart healthy/low carb diet as closely as she should, but agrees to start exercising frequently again and will start following a tighter healthy/low carb diet. Will recheck fasting lipids prior to f/u with me in 3 months. Will monitor.   - Lipid Panel; Future    3. Prediabetes  [facility] and serum insulin and A1C was in prediabetic range at 5.7% and serum insulin was elevated at 57.4. Due to family hx of DM2 they started pt on Metformin 500 mg po bid. Pt reports taking as directed, but admits that she has not been exercising as frequently as she should and has not been following her heart healthy/low carb diet as closely as she should. Discussed with pt. Pt agrees to start exercising frequently again and will start following a tighter healthy/low carb diet. Will continue/refill Metformin at same dose of now. Will recheck fasting CMP/A1C prior to f/u with me in 3 months. Will monitor.   - metFORMIN (GLUCOPHAGE) 500 MG tablet; Take 1 tablet by mouth 2 times daily (with meals)  Dispense: 180 tablet; Refill: 1  - Hemoglobin A1C; Future  - Comprehensive Metabolic Panel; Future    4. Obesity, morbid, BMI 40.0-49.9 ([facility] [address]  See # 2 and 3 above. 5. Encounter for immunization  Pt UTD on Covid vax x 2 doses. Pt declined Tdap and Flu vax today. Will continue to encourage pt to get UTD on these at future appointments. 6. Screening for cervical cancer  [facility] 7. Rash due to allergy  Discussed with pt. Will have pt try to avoid all lotions for now. Will give PRN Triamcinolone ointment to be used as directed if allergic rash recurs. Will start pt on Zyrtec 10 mg po daily to try to prevent. Pt given a referral to Allergist to discuss allergy testing.  Pt to f/u with me in 3 months to recheck. Pt agrees to call sooner for concerns/new or worsening symptoms. Pt agrees to go to ER for severe symptoms as discussed. - triamcinolone (KENALOG) 0.1 % ointment; Apply thin layer to affected area bid for up to 7 days as needed for allergic rash  Dispense: 30 g; Refill: 2  - [facility]  - cetirizine (ZYRTEC) 10 MG tablet; Take 1 tablet by mouth daily  Dispense: 30 tablet; Refill: 2      Return in about 3 months (around 2023) for To recheck HLD/prediab/allergic rash. Call sooner for concerns. Fasting labs prior. SUBJECTIVE/OBJECTIVE:    HPI 22-  Brigido Chapman (: 2000) is a 20 y.o. female, Established patient patient, here for evaluation of the following chief complaint(s):  Follow-up (Pt here today for routine lab review and to recheck tension migraines and situational depression/anxiety. The pt reports following POC/taking medications as directed. LMP-current per pt), Headache (Pt reports tension migraines much improved. Now occuring just once a week and very mild. Pain relieved by PRN Flexeril and PRN OTC Excedrin Migraine per pt. Pt reports photophobia has resolved. Denies any new or worsening symptoms. Pt never made appt with Neuro as she feels her tension migraines are now well controlled and tolerable. ), and Stress (Pt reports situational depression/anxiety related to tension migraines is now resolved. Pt states that she has a lot less stress now that her tension migraines are better controlled. )    HPI today-  Brigido Chapman (: 2000) is a 20 y.o. female, Established patient patient, here for evaluation of the following chief complaint(s):  Follow-up (Pt here today for routine lab review and to recheck tension migraines, HLD, and prediabetes. Pt reports taking medications/following previous POC as discussed. Please see ROS/Assessment and Plan sections for full details of these conditions.  Pt has one additional concern today. ) and Rash (Pt reports having an intermittent pruritic rash to bilat arms/legs, back, chest, abdomen that comes and goes for past few months after changing lotions. Pt reports trying to switch to a free and clear lotion and rash still seems to occur after use. Pt does not know of anything else that she changed. Pt denies any associated face, lip, tongue, throat edema or any trouble breathing or swallowing. Pt has no rash at the present time. Pt would like referral to Allergist for allergy testing. )    LMP 1/11/23 per pt    Allergies   Allergen Reactions    Shellfish-Derived Products      @[email or web address]  Past Medical History:   Diagnosis Date    Chronic tension headache     Hypercholesteremia     Prediabetes      History reviewed. No pertinent surgical history. Family History   Problem Relation Age of Onset    No Known Problems Mother     No Known Problems Father     No Known Problems Brother     No Known Problems Brother     No Known Problems Brother     No Known Problems Brother     Diabetes Maternal Grandmother     Lung Cancer Maternal Grandfather     Diabetes Paternal Grandmother     Prostate Cancer Paternal Grandfather      Social History     Tobacco Use   Smoking Status Never   Smokeless Tobacco Never         Review of Systems   Constitutional: Negative. Negative for appetite change, chills, diaphoresis, fatigue, fever and unexpected weight change. HENT: Negative. Negative for congestion, dental problem, drooling, ear discharge, ear pain, facial swelling, hearing loss, mouth sores, nosebleeds, postnasal drip, rhinorrhea, sinus pressure, sinus pain, sneezing, sore throat, tinnitus, trouble swallowing and voice change. Eyes: Negative. Negative for photophobia, pain, discharge and visual disturbance. Respiratory: Negative. Negative for apnea, cough, choking, chest tightness, shortness of breath, wheezing and stridor. Cardiovascular: Negative.   Negative for chest pain, palpitations and leg swelling. Gastrointestinal: Negative. Negative for abdominal distention, abdominal pain, anal bleeding, blood in stool, constipation, diarrhea, nausea, rectal pain and vomiting. Endocrine: Negative. Negative for cold intolerance, heat intolerance, polydipsia, polyphagia and polyuria. Genitourinary: Negative. Negative for decreased urine volume, difficulty urinating, dyspareunia, dysuria, flank pain, frequency, genital sores, hematuria, menstrual problem, pelvic pain, urgency, vaginal bleeding, vaginal discharge and vaginal pain. Musculoskeletal:  Positive for neck pain (trapezius and cervical paraspinus muscle tension that spreads to back, top, front of head during tension migraines-now very mild/infrequent per pt). Negative for arthralgias, back pain, gait problem, joint swelling, myalgias and neck stiffness. Skin:  Positive for rash. Negative for color change, pallor and wound. Pt reports having an intermittent pruritic rash to bilat arms/legs, back, chest, abdomen that comes and goes for past few months after changing lotions. Pt reports trying to switch to a free and clear lotion and rash still seems to occur after use. Pt does not know of anything else that she changed. Pt denies any associated face, lip, tongue, throat edema or any trouble breathing or swallowing. Pt has no rash at the present time. Pt would like referral to Allergist for allergy testing. Allergic/Immunologic: Positive for environmental allergies (see Skin). Negative for food allergies and immunocompromised state. Neurological:  Positive for headaches (tension migraines much improved per pt-very rare/mild-relieved by PRN Flexeril and PRN OTC Excedrin Migraine per pt). Negative for dizziness, tremors, seizures, syncope, facial asymmetry, speech difficulty, weakness, light-headedness and numbness. Hematological: Negative. Negative for adenopathy. Does not bruise/bleed easily. Psychiatric/Behavioral: Negative. Negative for dysphoric mood, hallucinations, self-injury, sleep disturbance and suicidal ideas. The patient is not nervous/anxious. Vitals:    01/12/23 0904   BP: 114/82   Pulse: 83   SpO2: 99%       Physical Exam  Vitals reviewed. Constitutional:       General: She is not in acute distress. Appearance: Normal appearance. She is obese. She is not ill-appearing, toxic-appearing or diaphoretic. HENT:      Head: Normocephalic and atraumatic. Right Ear: Tympanic membrane, ear canal and external ear normal. There is no impacted cerumen. Left Ear: Tympanic membrane, ear canal and external ear normal. There is no impacted cerumen. Nose: Nose normal. No congestion or rhinorrhea. Mouth/Throat:      Mouth: Mucous membranes are moist.      Pharynx: Oropharynx is clear. No oropharyngeal exudate or posterior oropharyngeal erythema. Comments: No edema noted to face, lip, tongue, throat and pt is having no trouble breathing or swallowing-airway intact. Eyes:      General: No scleral icterus. Right eye: No discharge. Left eye: No discharge. Extraocular Movements: Extraocular movements intact. Conjunctiva/sclera: Conjunctivae normal.      Pupils: Pupils are equal, round, and reactive to light. Neck:      Comments: Good C and L spine alignment. No edema or point ttp to cervical vertebrae. No bilat trapezius or bilat cervical paraspinus spasm/tenderness today  Cardiovascular:      Rate and Rhythm: Normal rate and regular rhythm. Pulses: Normal pulses. Heart sounds: Normal heart sounds. No murmur heard. No friction rub. No gallop. Pulmonary:      Effort: Pulmonary effort is normal. No respiratory distress. Breath sounds: Normal breath sounds. No stridor. No wheezing, rhonchi or rales. Chest:      Chest wall: No tenderness. Abdominal:      General: Abdomen is flat. Bowel sounds are normal. There is no distension.       Palpations: Abdomen is soft.      Tenderness: There is no abdominal tenderness. There is no guarding. Musculoskeletal:         General: No swelling, tenderness, deformity or signs of injury. Normal range of motion. Cervical back: Normal range of motion and neck supple. No rigidity or tenderness. Right lower leg: No edema. Left lower leg: No edema. Comments: Gait steady and unassisted   Lymphadenopathy:      Cervical: No cervical adenopathy. Skin:     General: Skin is warm. Capillary Refill: Capillary refill takes less than 2 seconds. Coloration: Skin is not jaundiced or pale. Findings: No bruising, erythema, lesion or rash (no rash noted today). Neurological:      General: No focal deficit present. Mental Status: She is alert and oriented to person, place, and time. Cranial Nerves: No cranial nerve deficit. Sensory: No sensory deficit. Motor: No weakness. Coordination: Coordination normal.      Gait: Gait normal.   Psychiatric:         Mood and Affect: Mood normal.         Behavior: Behavior normal.         Thought Content:  Thought content normal.         Judgment: Judgment normal.     Component      Latest Ref Rng & Units 1/9/2023 6/14/2022          11:43 AM  4:36 PM   CHOLESTEROL, TOTAL, 264263      <200 MG/ (H) 189   Triglycerides      35 - 150 MG/ 68   HDL Cholesterol      40 - 60 MG/DL 50 54   LDL Calculated      <100 MG/.6 (H) 121.4 (H)   VLDL Cholesterol Calculated      6.0 - 23.0 MG/DL 20.4 13.6   Chol/HDL Ratio       4.0 3.5     Component      Latest Ref Rng & Units 1/9/2023 6/14/2022 6/14/2022 6/14/2022          11:43 AM  4:36 PM  4:36 PM  4:36 PM   TSH, 3RD GENERATION      0.358 - 3.740 uIU/mL    1.350   T4 Free      0.9 - 1.8 NG/DL   1.3    Magnesium      1.8 - 2.4 mg/dL  2.3     Sed Rate, Automated      0 - 20 mm/hr 16        Component      Latest Ref Rng & Units 10/20/2022 10/20/2022          11:51 AM 11:51 AM   Hemoglobin A1C      4.8 - 5.6 %  5.7 (H)   eAG (mg/dL)      mg/dL  117   Insulin      2.6 - 24.9 uIU/mL 57.4 (H)      Component      Latest Ref Rng & Units 6/14/2022 6/14/2022 6/14/2022           4:36 PM  4:36 PM  4:36 PM   WBC      4.3 - 11.1 K/uL  10.4    RBC      4.05 - 5.2 M/uL  4.24    Hemoglobin Quant      11.7 - 15.4 g/dL  11.8    Hematocrit      35.8 - 46.3 %  37.4    MCV      79.6 - 97.8 FL  88.2    MCH      26.1 - 32.9 PG  27.8    MCHC      31.4 - 35.0 g/dL  31.6    RDW      11.9 - 14.6 %  13.4    Platelet Count      391 - 450 K/uL  412    MPV      9.4 - 12.3 FL  9.5    Nucleated Red Blood Cells      0.0 - 0.2 K/uL  0.00    Differential Type        AUTOMATED    Seg Neutrophils      43 - 78 %  68    Lymphocytes      13 - 44 %  24    Monocytes      4.0 - 12.0 %  6    Eosinophils %      0.5 - 7.8 %  1    Basophils      0.0 - 2.0 %  0    Immature Granulocytes      0.0 - 5.0 %  1    Segs Absolute      1.7 - 8.2 K/UL  7.1    Absolute Lymph #      0.5 - 4.6 K/UL  2.5    Absolute Mono #      0.1 - 1.3 K/UL  0.7    Absolute Eos #      0.0 - 0.8 K/UL  0.1    Basophils Absolute      0.0 - 0.2 K/UL  0.0    Absolute Immature Granulocyte      0.0 - 0.5 K/UL  0.1    Sodium      136 - 145 mmol/L 139     Potassium      3.5 - 5.1 mmol/L 4.0     Chloride      98 - 107 mmol/L 107     CO2      21 - 32 mmol/L 23     Anion Gap      7 - 16 mmol/L 9     Glucose, Random      65 - 100 mg/dL 90     BUN,BUNPL      6 - 23 MG/DL 15     Creatinine      0.6 - 1.0 MG/DL 0.90     GFR African American      >60 ml/min/1.73m2 >60     GFR Non-African American      >60 ml/min/1.73m2 >60     CALCIUM, SERUM, 216050      8.3 - 10.4 MG/DL 9.4     Bilirubin      0.2 - 1.1 MG/DL 0.3     ALT      12 - 65 U/L 20     AST      15 - 37 U/L 12 (L)     Alk Phosphatase      50 - 136 U/L 80     Total Protein      6.3 - 8.2 g/dL 7.5     Albumin      3.5 - 5.0 g/dL 3.9     Globulin      2.3 - 3.5 g/dL 3.6 (H)     ALBUMIN/GLOBULIN RATIO      1.2 - 3.5   1.1 (L)     Hepatitis C Ab NONREACTIVE     NONREACTIVE     PLEASE NOTE:  This document has been produced using voice recognition software. Unrecognized errors in transcription may be present. On this date 1/12/2023 I have spent 30 minutes reviewing previous notes, test results and face to face with the patient discussing the diagnosis and importance of compliance with the treatment plan as well as documenting on the day of the visit. An electronic signature was used to authenticate this note.   -- ANTHONY Ireland - NP

## 2023-03-07 NOTE — PROGRESS NOTES
Mirena IUD Insertion          Name:  Abraham Panchal    :  2000 Age:  20 y.o. Contraceptive method:  none  LMP:  No LMP recorded. UCG:Negative   IUD Lot # [id number] exp date 2024    Abraham Panchal has not had intercourse in the last 2 weeks    Procedure:  Patient was counseled regarding 99% efficacy, risk of irregular bleeding for the first 6 months following insertion, small risk or expulsion or uterine perforation with need for surgical removal.  Consent form is signed. Speculum was inserted to visualize the cervix. Cervix was cleansed with zephrine, sterile gloves were donned, and a single-toothed tenaculum applied to the anterior lip of the cervix. Pt moving a lot during insertion due to discomfort---body habitus made insertion very difficult. Additionally, US picture quality very poor and difficult to place device under US guidance. Cervix was dilated and sounded to 8 cm. Mirena IUD was inserted per the 's instructions. String was cut at 2 cm from the OS. Done under US guidance due to [facility]. Large likely R dermoid seen on prescan---pt reports pain primarily RLQ. Disc would rec consider removal.  Upon post insertion US, IUD was found to be approx 1.4cm from fundus but NOT within cx. Disc possibility IUD may float up to fundal position. Disc possibility may also expel. Rec reassess with US in 6-8wks with physician review to eval dermoid and consider surgical management. Pt agrees, will use condoms until IUD position reassessed. Ultrasound findings from today 3/8/23  GYN US performed secondary to menorrhagia with irregular cycles, dysmenorrhea    CX appears wnl   Uterus is anteverted and heterogenous. Endo=8.3 mm, debris moving through endo, isoechoic/echogenic intracavitary mass visualized with feeder vessel, possible polyp vs other measuring 0.6 x 0.3 x 0.5 cm.    ROV visualized with probable dermoid measuring 5.9 x 4.2 x 4.5 cm with minimal ovarian tissue visualized   LOV visualized with follicles and probable hemorrhagic CL  No aDn masses or fluid seen    US guidance IUD insertion with NP Darrian Osullivan, difficult due to PBH and movement of pt, IUD located in MID to MAYO, Per DA recheck in about 6-8 weeks.        Supervising physician is Dr. Anali Serna [facility]

## 2023-03-08 ENCOUNTER — PROCEDURE VISIT (OUTPATIENT)
Dept: OBGYN CLINIC | Age: 23
End: 2023-03-08

## 2023-03-08 ENCOUNTER — OFFICE VISIT (OUTPATIENT)
Dept: OBGYN CLINIC | Age: 23
End: 2023-03-08
Payer: COMMERCIAL

## 2023-03-08 VITALS — HEIGHT: 69 IN | WEIGHT: 270.2 LBS | BODY MASS INDEX: 40.02 KG/M2

## 2023-03-08 DIAGNOSIS — N92.1 MENORRHAGIA WITH IRREGULAR CYCLE: Primary | ICD-10-CM

## 2023-03-08 DIAGNOSIS — N94.6 DYSMENORRHEA: ICD-10-CM

## 2023-03-08 DIAGNOSIS — Z30.430 ENCOUNTER FOR IUD INSERTION: Primary | ICD-10-CM

## 2023-03-08 PROCEDURE — 76830 TRANSVAGINAL US NON-OB: CPT | Performed by: NURSE PRACTITIONER

## 2023-03-08 NOTE — PROGRESS NOTES
Ultrasound findings from today 3/8/23  GYN US performed secondary to menorrhagia with irregular cycles, dysmenorrhea    CX appears wnl   Uterus is anteverted and heterogenous. Endo=8.3 mm, debris moving through endo, isoechoic/echogenic intracavitary mass visualized with feeder vessel, possible polyp vs other measuring 0.6 x 0.3 x 0.5 cm. ROV visualized with probable dermoid measuring 5.9 x 4.2 x 4.5 cm with minimal ovarian tissue visualized   LOV visualized with follicles and probable hemorrhagic CL  No aDn masses or fluid seen    US guidance IUD insertion with PATRICE Perry, difficult due to PBH and movement of pt, IUD located in MID to MAYO, Per DA recheck in about 6-8 weeks.

## 2023-03-15 NOTE — PROGRESS NOTES
The patient is a 20 y.o. Laura Brower who is seen for cramping and pain. Pt had Mirena placed 3/8/23. Having lower back pain when lifting objects. States she has cysts on both ovaries and was not sure if this was the reason for the pain. Just wanted to double check this was normal.     Last visit with very difficult US guided IUD insertion. Post-insertion scan revealed low lying IUD, she is scheduled for recheck next month to assess position. Incidentally found large 6cm likely dermoid cyst also seen. Pt had c/o pelvic pain prior to insertion that was more R sided, worse with her menses. She is scheduled on 4/19 with Dr. Halley Jaime for repeat US to recheck IUD position as well as talk for surgical mgmt of R dermoid cyst.     Pain began after IUD insertion but was its worst 3 days ago, she feels like it is midline and a pulling sensation. She feels it is radiating toward her back, making it difficult to work. Again, pain primarily midline. She notes the pain that began Monday has improved significantly, currently only with a very small amount of pain. No vaginal bleeding. US performed to eval IUD position and investigate pelvic pain    Ultrasound findings from today 3/16/23  GYN TV US performed secondary to IUD check      CX appears wnl. Iud strings visualized in canal  Uterus is anteverted and slightly heterogenous      Endo=8.3 mm, slightly ill defined, C/W focal adenomyosis. IUD visualized 0.9 cm from fundal portion (prev 1.3 cm). T arms extended laterally. Isoechoic mass with feeder vessel again visualized inferior to IUD c/w possible polyp measuring 0.4 x 0.3 x 0.6 cm (prev 0.6 x 0.3 x 0.5 cm)   ROV visualized only TA due to large amount of bowel gas making dermoid appear isoechoic. Fat containing dermoid again visualized measuring 5.9 x 3.9 x 4.8 cm. (Prev 5.9 x 4.2 x 4.5 cm) minimal ovaria tissue noted again.  Artial with some venous flow noted in ovarian tissue visualized   LOV is visualized with follicles and wnl   Bilateral aDn appear wnl . HISTORY:      Patient's last menstrual period was 2023 (approximate). Sexual History:  not sexually active (Has been in the past)  Contraception:  IUD  Current Outpatient Medications on File Prior to Visit   Medication Sig Dispense Refill    levonorgestrel (MIRENA) IUD 52 mg 1 each by IntraUTERine route once      triamcinolone (KENALOG) 0.1 % ointment Apply thin layer to affected area bid for up to 7 days as needed for allergic rash 30 g 2    cyclobenzaprine (FLEXERIL) 5 MG tablet Take 1 tablet by mouth 2 times daily as needed (tension headaches-watch for sedation) 30 tablet 2    metFORMIN (GLUCOPHAGE) 500 MG tablet Take 1 tablet by mouth 2 times daily (with meals) 180 tablet 1    cetirizine (ZYRTEC) 10 MG tablet Take 1 tablet by mouth daily 30 tablet 2     No current facility-administered medications on file prior to visit. ROS:  Feeling well. No dyspnea or chest pain on exertion. No abdominal pain, change in bowel habits, black or bloody stools. No urinary tract symptoms. GYN ROS: she complains of pelvic pain with known low lying IUD in place. PHYSICAL EXAM:  Blood pressure 116/68, weight 273 lb 3.2 oz (123.9 kg), last menstrual period 2023. The patient appears well, alert, oriented x 3, in no distress. Pelvic: VULVA: normal appearing vulva with no masses, tenderness or lesions, VAGINA: normal appearing vagina with normal color and discharge, no lesions, CERVIX: normal appearing cervix without discharge or lesions. IUD strings seen at os, tip of device was not seen at os. ASSESSMENT:  Encounter Diagnoses   Name Primary? IUD check up     Pelvic pain Yes    Dermoid cyst of right ovary        PLAN:  All questions answered  Diagnosis explained in detail, including differential  Lengthy US review with pt. Reassured that IUD appears to be moving toward fundus, known R dermoid cyst is stable in size and no other acute findings. Offered to remove IUD today to see if helps improve pain---declines. She wishes to keep in place at this time. Rec NSAID prn to help with pain. She will let me know should pain worsen. ED for severe pain. She will FU as scheduled with Dr. Duy Lemons to once again recheck IUD position with US and for talk re: surgical mgmt of R dermoid cyst.       Orders Placed This Encounter   Procedures    AMB POC US, TRANSVAGINAL     Order Specific Question:   Are you Pregnant? Answer:   No    AMB POC US, PELVIC (NONOBSTETRIC),REAL TIME,LIMITED     Order Specific Question:   Are you Pregnant? Answer:   No       Supervising physician is Dr. Shady Araujo.   30 min chart review, US review, exam, counseling and documentation

## 2023-03-16 ENCOUNTER — OFFICE VISIT (OUTPATIENT)
Dept: OBGYN CLINIC | Age: 23
End: 2023-03-16

## 2023-03-16 VITALS — WEIGHT: 273.2 LBS | SYSTOLIC BLOOD PRESSURE: 116 MMHG | BODY MASS INDEX: 40.94 KG/M2 | DIASTOLIC BLOOD PRESSURE: 68 MMHG

## 2023-03-16 DIAGNOSIS — Z30.431 IUD CHECK UP: ICD-10-CM

## 2023-03-16 DIAGNOSIS — D27.0 DERMOID CYST OF RIGHT OVARY: ICD-10-CM

## 2023-03-16 DIAGNOSIS — R10.2 PELVIC PAIN: Primary | ICD-10-CM

## 2023-03-16 NOTE — PROGRESS NOTES
Ultrasound findings from today 3/16/23  GYN TV US performed secondary to IUD check     CX appears wnl. Iud strings visualized in canal  Uterus is anteverted and slightly heterogenous     Endo=8.3 mm, slightly ill defined, C/W focal adenomyosis. IUD visualized 0.9 cm from fundal portion (prev 1.3 cm). T arms extended laterally. Isoechoic mass with feeder vessel again visualized inferior to IUD c/w possible polyp measuring 0.4 x 0.3 x 0.6 cm (prev 0.6 x 0.3 x 0.5 cm)   ROV visualized only TA due to large amount of bowel gas making dermoid appear isoechoic. Fat containing dermoid again visualized measuring 5.9 x 3.9 x 4.8 cm. (Prev 5.9 x 4.2 x 4.5 cm) minimal ovaria tissue noted again. Artial with some venous flow noted in ovarian tissue visualized   LOV is visualized with follicles and wnl   Bilateral aDn appear wnl .

## 2023-03-25 DIAGNOSIS — T78.40XA RASH DUE TO ALLERGY: ICD-10-CM

## 2023-03-25 DIAGNOSIS — R21 RASH DUE TO ALLERGY: ICD-10-CM

## 2023-03-27 RX ORDER — CETIRIZINE HYDROCHLORIDE 10 MG/1
TABLET ORAL
Qty: 90 TABLET | Refills: 1 | Status: SHIPPED | OUTPATIENT
Start: 2023-03-27

## 2023-04-18 NOTE — PROGRESS NOTES
Venetia Lefort is here for a surgical consultation on removal of right dermoid cyst.     Ultrasound findings from today:   Cervix appears within normal limits, strings visualized in canal.   Uterus volume= 57.09 mL  Uterus is anteverted and heterogenous, c/w adenomyosis. Endo again slightly ill defined due to areas of focal adenomyosis. Endo= 3.0 mm, possible polyp again noted measuring 0.4 x 0.4 x 0.4 cm (previously 0.4 x 0.3 x 0.6 cm)  IUD visualized approximately 0.8 cm from fundus (previously 0.9 cm), T arms extended laterally. ROV visualized again visualized best TA with dermoid measuring 5.6 x 4.7 x 5.1 cm (previously 5.9 x 3.9 x 4.8 cm) appears to contain cystic spaces today. Minimal amount of ovarian tissue noted  LOV visualized with follicles and within normal limits. No adnexa masses or fluid seen. Ultrasound findings from 3/16/23  GYN TV US performed secondary to IUD check   CX appears wnl. Iud strings visualized in canal  Uterus is anteverted and slightly heterogenous   Endo=8.3 mm, slightly ill defined, C/W focal adenomyosis. IUD visualized 0.9 cm from fundal portion (prev 1.3 cm). T arms extended laterally. Isoechoic mass with feeder vessel again visualized inferior to IUD c/w possible polyp measuring 0.4 x 0.3 x 0.6 cm (prev 0.6 x 0.3 x 0.5 cm)   ROV visualized only TA due to large amount of bowel gas making dermoid appear isoechoic. Fat containing dermoid again visualized measuring 5.9 x 3.9 x 4.8 cm. (Prev 5.9 x 4.2 x 4.5 cm) minimal ovaria tissue noted again. Artial with some venous flow noted in ovarian tissue visualized   LOV is visualized with follicles and wnl   Bilateral adnexa appear wnl . HISTORY:    Patient's last menstrual period was 2023 (exact date).   Sexual History:  has sex with males  Contraception:  IUD  Current Outpatient Medications on File Prior to Visit   Medication Sig Dispense Refill    cetirizine (ZYRTEC) 10 MG tablet TAKE 1 TABLET BY MOUTH EVERY DAY 90

## 2023-04-19 ENCOUNTER — OFFICE VISIT (OUTPATIENT)
Dept: OBGYN CLINIC | Age: 23
End: 2023-04-19
Payer: COMMERCIAL

## 2023-04-19 VITALS — BODY MASS INDEX: 39.75 KG/M2 | WEIGHT: 268.4 LBS | HEIGHT: 69 IN

## 2023-04-19 DIAGNOSIS — R10.2 PELVIC PAIN: ICD-10-CM

## 2023-04-19 DIAGNOSIS — D27.0 DERMOID CYST OF RIGHT OVARY: Primary | ICD-10-CM

## 2023-04-19 DIAGNOSIS — Z30.431 IUD CHECK UP: ICD-10-CM

## 2023-04-19 PROCEDURE — 76830 TRANSVAGINAL US NON-OB: CPT | Performed by: OBSTETRICS & GYNECOLOGY

## 2023-04-19 PROCEDURE — 99214 OFFICE O/P EST MOD 30 MIN: CPT | Performed by: OBSTETRICS & GYNECOLOGY
